# Patient Record
Sex: MALE | Race: WHITE | Employment: OTHER
[De-identification: names, ages, dates, MRNs, and addresses within clinical notes are randomized per-mention and may not be internally consistent; named-entity substitution may affect disease eponyms.]

---

## 2020-09-04 ENCOUNTER — TRANSCRIBE ORDERS (OUTPATIENT)
Dept: OTHER | Age: 70
End: 2020-09-04

## 2020-09-04 DIAGNOSIS — D49.6 BRAIN TUMOR (H): Primary | ICD-10-CM

## 2020-09-09 ENCOUNTER — PRE VISIT (OUTPATIENT)
Dept: NEUROSURGERY | Facility: CLINIC | Age: 70
End: 2020-09-09

## 2020-09-09 NOTE — TELEPHONE ENCOUNTER
FUTURE VISIT INFORMATION      FUTURE VISIT INFORMATION:    Date: 9/11/2020    Time: 115pm    [place] [facility] [place] INFORMATION:    Referring provider:      Referring providers clinic:  [facility]     Reason for visit/diagnosis  Brain Mass     RECORDS REQUESTED FROM:       Clinic name Comments Records Status Imaging Status   [facility]   Requested  Requested                                    -9/10/2020-Spoke with [facility] Radiology. They received request for images yesterday and overnighted them this morning-MR @ 1055am    Action 09/10/20 12:11 PM - [place]   Action Taken  Imaging disc received from [facility] and sent to [facility] to be uploaded into PACs.     -9/11/2020-[facility] images now in PACS-MR @ 532am

## 2020-09-11 ENCOUNTER — PREP FOR PROCEDURE (OUTPATIENT)
Dept: NEUROSURGERY | Facility: CLINIC | Age: 70
End: 2020-09-11

## 2020-09-11 ENCOUNTER — VIRTUAL VISIT (OUTPATIENT)
Dept: NEUROSURGERY | Facility: CLINIC | Age: 70
End: 2020-09-11
Attending: NEUROLOGICAL SURGERY
Payer: COMMERCIAL

## 2020-09-11 DIAGNOSIS — C71.9 GLIOMA (EXCEPT NASAL GLIOMA, NOT NEOPLASTIC) (H): Primary | ICD-10-CM

## 2020-09-11 RX ORDER — METOPROLOL SUCCINATE 25 MG/1
25 TABLET, EXTENDED RELEASE ORAL DAILY
COMMUNITY

## 2020-09-11 RX ORDER — TIOTROPIUM BROMIDE 18 UG/1
18 CAPSULE ORAL; RESPIRATORY (INHALATION) DAILY
COMMUNITY

## 2020-09-11 RX ORDER — POLYETHYLENE GLYCOL 3350 17 G
2 POWDER IN PACKET (EA) ORAL
COMMUNITY

## 2020-09-11 RX ORDER — DEXAMETHASONE 4 MG/1
4 TABLET ORAL EVERY 8 HOURS
Status: ON HOLD | COMMUNITY
End: 2020-09-26

## 2020-09-11 RX ORDER — LEVETIRACETAM 750 MG/1
750 TABLET ORAL 2 TIMES DAILY
Status: ON HOLD | COMMUNITY
End: 2020-09-26

## 2020-09-11 NOTE — LETTER
"    9/11/2020         RE: Alin Reed  [address] [address] [address]        Dear Colleague,    Thank you for referring your patient, Alin Reed, to the [facility]. Please see a copy of my visit note below.    Alin Reed is a 70 year old male who is being evaluated via a billable telephone visit.      The patient has been notified of following:     \"This telephone visit will be conducted via a call between you and your physician/provider. We have found that certain health care needs can be provided without the need for a physical exam.  This service lets us provide the care you need with a short phone conversation.  If a prescription is necessary we can send it directly to your pharmacy.  If lab work is needed we can place an order for that and you can then stop by our lab to have the test done at a later time.    Telephone visits are billed at different rates depending on your insurance coverage. During this emergency period, for some insurers they may be billed the same as an in-person visit.  Please reach out to your insurance provider with any questions.    If during the course of the call the physician/provider feels a telephone visit is not appropriate, you will not be charged for this service.\"    Patient has given verbal consent for Telephone visit?  Yes    What phone number would you like to be contacted at? [phone]    How would you like to obtain your AVS? Mail a copy     Vitals - Patient Reported  Weight (Patient Reported): 63.5 kg (140 lb 1.6 oz)  Height (Patient Reported): 167.6 cm (5' 6\")  BMI (Based on Pt Reported Ht/Wt): 22.61  Pain Score: No Pain (0)    Jose Alberto Blue LPN    Phone call duration: 30 minutes    70 M who presented with new onset seizure. MRI showed a 2 x 1.8 x 1.8 cm CE+ lesion in the supplemental motor area with moderate amount of sai-lesional FLAIR abnormality. On review of systems, the pateint notes difficulty \"finding words' when he is " tired. This symptom resolves after rest. The patient also noted intermittent right hemiparesis.    PMH: newly diagnosed lung nodule, biopsy showed no evidence of neoplasm.    Current Outpatient Medications:      budesonide (PULMICORT FLEXHALER) 90 MCG/ACT inhaler, Inhale 2 puffs into the lungs 2 times daily, Disp: , Rfl:      dexamethasone (DECADRON) 4 MG tablet, Take 4 mg by mouth every 8 hours, Disp: , Rfl:      levETIRAcetam (KEPPRA) 750 MG tablet, Take 750 mg by mouth 2 times daily, Disp: , Rfl:      metFORMIN (GLUCOPHAGE) 500 MG tablet, Take 500 mg by mouth 2 times daily (with meals), Disp: , Rfl:      metoprolol succinate ER (TOPROL-XL) 25 MG 24 hr tablet, Take 25 mg by mouth daily, Disp: , Rfl:      nicotine (COMMIT) 2 MG lozenge, Place 2 mg inside cheek every hour as needed for smoking cessation, Disp: , Rfl:      omeprazole (PRILOSEC) 20 MG DR capsule, Take 20 mg by mouth daily, Disp: , Rfl:      tiotropium (SPIRIVA) 18 MCG inhaled capsule, Inhale 18 mcg into the lungs daily, Disp: , Rfl:     Allergies   Allergen Reactions     Wellbutrin [Bupropion]      Patient reports that he is at baseline and non-focal     MRI from 8/28/2020 is as above described.    AP: 70 M with a new intracranial CE+ lesion worrisome for neoplastic diagnosis. I have reviewed the MRI with the patient. I believe that the patient can benefit from an awake craniotomy with goal for definitive tissue diagnosis and maximal safe resection. Risks and benefits of the procedure were reviewed with the patient. Verbal informed consent was obtained. I will proceed to schedule surgery and consent the patient formally on the morning of the procedure.  All questions were answered.    I have spent 30 minutes today, with the majority of the visit counseling the patient on the diagnosis. All questions were answered. Over 50% of my time on the unit was spent counseling the patient and coordinating surgery.          Amadou Cummins MD        Again, thank  you for allowing me to participate in the care of your patient.        Sincerely,        Amadou Cummins MD

## 2020-09-11 NOTE — PROGRESS NOTES
Pre-operative MRI    Awake craniotomy  5ALA/OH6  [facility]  Stealth neuronavigation

## 2020-09-11 NOTE — PROGRESS NOTES
"Alin Reed is a 70 year old male who is being evaluated via a billable telephone visit.      The patient has been notified of following:     \"This telephone visit will be conducted via a call between you and your physician/provider. We have found that certain health care needs can be provided without the need for a physical exam.  This service lets us provide the care you need with a short phone conversation.  If a prescription is necessary we can send it directly to your pharmacy.  If lab work is needed we can place an order for that and you can then stop by our lab to have the test done at a later time.    Telephone visits are billed at different rates depending on your insurance coverage. During this emergency period, for some insurers they may be billed the same as an in-person visit.  Please reach out to your insurance provider with any questions.    If during the course of the call the physician/provider feels a telephone visit is not appropriate, you will not be charged for this service.\"    Patient has given verbal consent for Telephone visit?  Yes    What phone number would you like to be contacted at? [phone]    How would you like to obtain your AVS? Mail a copy     Vitals - Patient Reported  Weight (Patient Reported): 63.5 kg (140 lb 1.6 oz)  Height (Patient Reported): 167.6 cm (5' 6\")  BMI (Based on Pt Reported Ht/Wt): 22.61  Pain Score: No Pain (0)    Jose Alberto Blue LPSANGEETA    Phone call duration: 30 minutes    70 M who presented with new onset seizure. MRI showed a 2 x 1.8 x 1.8 cm CE+ lesion in the supplemental motor area with moderate amount of sai-lesional FLAIR abnormality. On review of systems, the pateint notes difficulty \"finding words' when he is tired. This symptom resolves after rest. The patient also noted intermittent right hemiparesis.    PMH: newly diagnosed lung nodule, biopsy showed no evidence of neoplasm.    Current Outpatient Medications:      budesonide (PULMICORT FLEXHALER) " 90 MCG/ACT inhaler, Inhale 2 puffs into the lungs 2 times daily, Disp: , Rfl:      dexamethasone (DECADRON) 4 MG tablet, Take 4 mg by mouth every 8 hours, Disp: , Rfl:      levETIRAcetam (KEPPRA) 750 MG tablet, Take 750 mg by mouth 2 times daily, Disp: , Rfl:      metFORMIN (GLUCOPHAGE) 500 MG tablet, Take 500 mg by mouth 2 times daily (with meals), Disp: , Rfl:      metoprolol succinate ER (TOPROL-XL) 25 MG 24 hr tablet, Take 25 mg by mouth daily, Disp: , Rfl:      nicotine (COMMIT) 2 MG lozenge, Place 2 mg inside cheek every hour as needed for smoking cessation, Disp: , Rfl:      omeprazole (PRILOSEC) 20 MG DR capsule, Take 20 mg by mouth daily, Disp: , Rfl:      tiotropium (SPIRIVA) 18 MCG inhaled capsule, Inhale 18 mcg into the lungs daily, Disp: , Rfl:     Allergies   Allergen Reactions     Wellbutrin [Bupropion]      Patient reports that he is at baseline and non-focal     MRI from 8/28/2020 is as above described.    AP: 70 M with a new intracranial CE+ lesion worrisome for neoplastic diagnosis. I have reviewed the MRI with the patient. I believe that the patient can benefit from an awake craniotomy with goal for definitive tissue diagnosis and maximal safe resection. Risks and benefits of the procedure were reviewed with the patient. Verbal informed consent was obtained. I will proceed to schedule surgery and consent the patient formally on the morning of the procedure.  All questions were answered.    I have spent 30 minutes today, with the majority of the visit counseling the patient on the diagnosis. All questions were answered. Over 50% of my time on the unit was spent counseling the patient and coordinating surgery.          Amadou Cummins MD

## 2020-09-11 NOTE — LETTER
Date:September 15, 2020      Patient was self referred, no letter generated. Do not send.        [facility] Health Information

## 2020-09-14 ENCOUNTER — TELEPHONE (OUTPATIENT)
Dept: NEUROSURGERY | Facility: CLINIC | Age: 70
End: 2020-09-14

## 2020-09-14 DIAGNOSIS — Z11.59 ENCOUNTER FOR SCREENING FOR OTHER VIRAL DISEASES: Primary | ICD-10-CM

## 2020-09-14 PROBLEM — C71.9 GLIOMA (EXCEPT NASAL GLIOMA, NOT NEOPLASTIC) (H): Status: ACTIVE | Noted: 2020-09-14

## 2020-09-14 NOTE — TELEPHONE ENCOUNTER
Called patient with surgery date and time.   Patient will have preop and covid testing done locally formerly [facility].

## 2020-09-15 ENCOUNTER — TELEPHONE (OUTPATIENT)
Dept: NEUROSURGERY | Facility: CLINIC | Age: 70
End: 2020-09-15

## 2020-09-15 NOTE — TELEPHONE ENCOUNTER
Spoke with Liss. Informed her patient would need Covid test 4 days prior to surgery with Dr. Cummins. Liss will help patient arrange testing. My contact information and fax number was given to Liss.    Ranjana Joseph, RN, BSN  Neuro-Oncology Care Coordinator  [facility]

## 2020-09-15 NOTE — TELEPHONE ENCOUNTER
[facility] Call Center    Phone Message    May a detailed message be left on voicemail: yes     Reason for Call: Other: Liss read RN from [facility] calling in regards to patient getting COVID test done per Dr. Cummins orders before patient gets biopsy done. Liss is wondering how far in advance this test is allowed to be done. [facility] and wondering if it can be done today. [facility] stated [facility] they normally do COVID tests done the same day as the procedure and is just trying to clarify when this needs to be done.     Please advise and call Liss back at [phone]: [phone]    Action Taken: Other:  NEUROSURGERY     Travel Screening: Not Applicable

## 2020-09-21 LAB — COVID-19 VIRUS PCR: NOT DETECTED

## 2020-09-22 ENCOUNTER — ANESTHESIA EVENT (OUTPATIENT)
Dept: SURGERY | Facility: CLINIC | Age: 70
DRG: 821 | End: 2020-09-22
Payer: COMMERCIAL

## 2020-09-22 ASSESSMENT — COPD QUESTIONNAIRES: CAT_SEVERITY: MODERATE

## 2020-09-22 ASSESSMENT — LIFESTYLE VARIABLES: TOBACCO_USE: 1

## 2020-09-22 ASSESSMENT — ENCOUNTER SYMPTOMS: SEIZURES: 1

## 2020-09-22 NOTE — ANESTHESIA PREPROCEDURE EVALUATION
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]   Procedure(s):  Intraoperative Magnetic Resonance Imaging Left Stealth Assisted Craniotomy     LABS:  CBC: No results found for: WBC, HGB, HCT, PLT  BMP: No results found for: NA, POTASSIUM, CHLORIDE, CO2, BUN, CR, GLC  COAGS: No results found for: PTT, INR, FIBR  POC: No results found for: BGM, HCG, HCGS  OTHER: No results found for: PH, LACT, A1C, JESUS, PHOS, MAG, ALBUMIN, PROTTOTAL, ALT, AST, GGT, ALKPHOS, BILITOTAL, BILIDIRECT, LIPASE, AMYLASE, CARMINE, TSH, T4, T3, CRP, SED     Preop Vitals    BP Readings from Last 3 Encounters:   No data found for BP    Pulse Readings from Last 3 Encounters:   No data found for Pulse      Resp Readings from Last 3 Encounters:   No data found for Resp    SpO2 Readings from Last 3 Encounters:   No data found for SpO2      Temp Readings from Last 1 Encounters:   No data found for Temp    Ht Readings from Last 1 Encounters:   No data found for Ht      Wt Readings from Last 1 Encounters:   No data found for Wt    There is no height or weight on file to calculate BMI.     LDA:        Past Medical History:   Diagnosis Date     COPD (chronic obstructive pulmonary disease) (H)      Hypertension      Lumbar spinal stenosis      Solitary lung nodule       History reviewed. No pertinent surgical history.   Allergies   Allergen Reactions     Wellbutrin [Bupropion]         Anesthesia Evaluation     .             ROS/MED HX    ENT/Pulmonary: Comment: Lung nodule s/p bx c/b PTX s/p chest tube, which has since been removed    (+)tobacco use, .25 packs/day  moderate COPD (on tiotropium and budesonide inhalers; well controlled), , . .   (-) sleep apnea   Neurologic: Comment: Glioma, on decadron, keppra    Lumbar stenosis; sciatica    (+)seizures (on levetiracetam) last  seizure: 8/28/20    (-) CVA and TIA   Cardiovascular: Comment: on metoprolol    (+) hypertension----. : . . . :. dysrhythmias (RBBB) . Previous cardiac testing Echodate:1/10/2019results:EF 55-60%, grade I diastolic dysfunction, no valvular abnormalities.date: results:ECG reviewed date:8/28/2020 results:Sinus rhythm, questionable RBBB date: results:         (-) CAD   METS/Exercise Tolerance:  3 - Able to walk 1-2 blocks without stopping   Hematologic:         Musculoskeletal:  - neg musculoskeletal ROS       GI/Hepatic:     (+) GERD (on omeprazole)       Renal/Genitourinary:  - ROS Renal section negative       Endo:     (+) type II DM .      Psychiatric:         Infectious Disease:  - neg infectious disease ROS       Malignancy:   (+) Malignancy (Left frontal lobe mass) History of Neuro          Other:    - neg other ROS                     PHYSICAL EXAM:   Mental Status/Neuro: A/A/O   Airway: Facies: Feasible  Mallampati: I  TM distance: > 6 cm  Neck ROM: Full   Respiratory: Auscultation: CTAB     Resp. Rate: Normal     Resp. Effort: Normal      CV: Rhythm: Regular  Heart: Normal Sounds   Comments:      Dental: Dentures  Dentures: Upper; Complete                Assessment: Procedure Canceled due to   ASA SCORE: 3       NPO Status: NPO Appropriate     Postop Plan:     Disposition: Inpatient/Admit     PONV Management:  NO PONV Prophylaxis Required       Comments for Plan/Consent:  GETA. PIVx2-3. Standard ASA monitors + arterial line. IV opioids, adjuncts. PACU then ICU postoperatively.    Discussed anesthetic plan with patient.  Patient agrees to blood transfusion if needed.                 Vince Gonzalez MD

## 2020-09-23 ENCOUNTER — ANESTHESIA (OUTPATIENT)
Dept: SURGERY | Facility: CLINIC | Age: 70
DRG: 821 | End: 2020-09-23
Payer: COMMERCIAL

## 2020-09-23 ENCOUNTER — HOSPITAL ENCOUNTER (OUTPATIENT)
Dept: MRI IMAGING | Facility: CLINIC | Age: 70
DRG: 821 | End: 2020-09-23
Attending: NEUROLOGICAL SURGERY
Payer: COMMERCIAL

## 2020-09-23 ENCOUNTER — HOSPITAL ENCOUNTER (INPATIENT)
Facility: CLINIC | Age: 70
LOS: 3 days | Discharge: HOME | DRG: 821 | End: 2020-09-26
Attending: NEUROLOGICAL SURGERY | Admitting: NEUROLOGICAL SURGERY
Payer: COMMERCIAL

## 2020-09-23 DIAGNOSIS — R73.9 HYPERGLYCEMIA: ICD-10-CM

## 2020-09-23 DIAGNOSIS — G93.89 BRAIN MASS: ICD-10-CM

## 2020-09-23 DIAGNOSIS — C85.91 LYMPHOMA OF LYMPH NODES OF HEAD, UNSPECIFIED LYMPHOMA TYPE (H): Primary | ICD-10-CM

## 2020-09-23 DIAGNOSIS — C71.9 GLIOMA (EXCEPT NASAL GLIOMA, NOT NEOPLASTIC) (H): ICD-10-CM

## 2020-09-23 DIAGNOSIS — Z98.890 STATUS POST CRANIOTOMY: ICD-10-CM

## 2020-09-23 LAB
ABO + RH BLD: NORMAL
ABO + RH BLD: NORMAL
BLD GP AB SCN SERPL QL: NORMAL
BLOOD BANK CMNT PATIENT-IMP: NORMAL
CREAT SERPL-MCNC: 0.55 MG/DL (ref 0.66–1.25)
ERYTHROCYTE [DISTWIDTH] IN BLOOD BY AUTOMATED COUNT: 12.6 % (ref 10–15)
GFR SERPL CREATININE-BSD FRML MDRD: >90 ML/MIN/{1.73_M2}
GLUCOSE BLDC GLUCOMTR-MCNC: 190 MG/DL (ref 70–99)
GLUCOSE BLDC GLUCOMTR-MCNC: 220 MG/DL (ref 70–99)
GLUCOSE BLDC GLUCOMTR-MCNC: 268 MG/DL (ref 70–99)
HBA1C MFR BLD: 7.2 % (ref 0–5.6)
HCT VFR BLD AUTO: 48.1 % (ref 40–53)
HGB BLD-MCNC: 16.4 G/DL (ref 13.3–17.7)
MCH RBC QN AUTO: 33.9 PG (ref 26.5–33)
MCHC RBC AUTO-ENTMCNC: 34.1 G/DL (ref 31.5–36.5)
MCV RBC AUTO: 99 FL (ref 78–100)
PLATELET # BLD AUTO: 150 10E9/L (ref 150–450)
POTASSIUM SERPL-SCNC: 4.3 MMOL/L (ref 3.4–5.3)
RBC # BLD AUTO: 4.84 10E12/L (ref 4.4–5.9)
SARS-COV-2 RNA SPEC QL NAA+PROBE: NORMAL
SPECIMEN EXP DATE BLD: NORMAL
SPECIMEN SOURCE: NORMAL
WBC # BLD AUTO: 12.9 10E9/L (ref 4–11)

## 2020-09-23 PROCEDURE — 86901 BLOOD TYPING SEROLOGIC RH(D): CPT | Performed by: ANESTHESIOLOGY

## 2020-09-23 PROCEDURE — A9585 GADOBUTROL INJECTION: HCPCS | Performed by: NEUROLOGICAL SURGERY

## 2020-09-23 PROCEDURE — 36415 COLL VENOUS BLD VENIPUNCTURE: CPT | Performed by: STUDENT IN AN ORGANIZED HEALTH CARE EDUCATION/TRAINING PROGRAM

## 2020-09-23 PROCEDURE — 70552 MRI BRAIN STEM W/DYE: CPT

## 2020-09-23 PROCEDURE — 00000146 ZZHCL STATISTIC GLUCOSE BY METER IP

## 2020-09-23 PROCEDURE — 82565 ASSAY OF CREATININE: CPT | Performed by: ANESTHESIOLOGY

## 2020-09-23 PROCEDURE — 83036 HEMOGLOBIN GLYCOSYLATED A1C: CPT | Performed by: STUDENT IN AN ORGANIZED HEALTH CARE EDUCATION/TRAINING PROGRAM

## 2020-09-23 PROCEDURE — 25500064 ZZH RX 255 OP 636: Performed by: NEUROLOGICAL SURGERY

## 2020-09-23 PROCEDURE — 86900 BLOOD TYPING SEROLOGIC ABO: CPT | Performed by: ANESTHESIOLOGY

## 2020-09-23 PROCEDURE — 85027 COMPLETE CBC AUTOMATED: CPT | Performed by: ANESTHESIOLOGY

## 2020-09-23 PROCEDURE — 00B70ZX EXCISION OF CEREBRAL HEMISPHERE, OPEN APPROACH, DIAGNOSTIC: ICD-10-PCS | Performed by: NEUROLOGICAL SURGERY

## 2020-09-23 PROCEDURE — 25000132 ZZH RX MED GY IP 250 OP 250 PS 637: Performed by: ANESTHESIOLOGY

## 2020-09-23 PROCEDURE — 12000001 ZZH R&B MED SURG/OB UMMC

## 2020-09-23 PROCEDURE — 36415 COLL VENOUS BLD VENIPUNCTURE: CPT | Performed by: ANESTHESIOLOGY

## 2020-09-23 PROCEDURE — 25000132 ZZH RX MED GY IP 250 OP 250 PS 637: Performed by: STUDENT IN AN ORGANIZED HEALTH CARE EDUCATION/TRAINING PROGRAM

## 2020-09-23 PROCEDURE — 84132 ASSAY OF SERUM POTASSIUM: CPT | Performed by: ANESTHESIOLOGY

## 2020-09-23 PROCEDURE — 25000131 ZZH RX MED GY IP 250 OP 636 PS 637: Performed by: STUDENT IN AN ORGANIZED HEALTH CARE EDUCATION/TRAINING PROGRAM

## 2020-09-23 PROCEDURE — U0003 INFECTIOUS AGENT DETECTION BY NUCLEIC ACID (DNA OR RNA); SEVERE ACUTE RESPIRATORY SYNDROME CORONAVIRUS 2 (SARS-COV-2) (CORONAVIRUS DISEASE [COVID-19]), AMPLIFIED PROBE TECHNIQUE, MAKING USE OF HIGH THROUGHPUT TECHNOLOGIES AS DESCRIBED BY CMS-2020-01-R: HCPCS | Performed by: STUDENT IN AN ORGANIZED HEALTH CARE EDUCATION/TRAINING PROGRAM

## 2020-09-23 PROCEDURE — 86850 RBC ANTIBODY SCREEN: CPT | Performed by: ANESTHESIOLOGY

## 2020-09-23 PROCEDURE — 40000883 ZZH CANCELLED SURGERY UP TO 61-90 MINS: Performed by: NEUROLOGICAL SURGERY

## 2020-09-23 PROCEDURE — 8E09XBH COMPUTER ASSISTED PROCEDURE OF HEAD AND NECK REGION, WITH MAGNETIC RESONANCE IMAGING: ICD-10-PCS | Performed by: NEUROLOGICAL SURGERY

## 2020-09-23 RX ORDER — POTASSIUM CHLORIDE 29.8 MG/ML
20 INJECTION INTRAVENOUS
Status: DISCONTINUED | OUTPATIENT
Start: 2020-09-23 | End: 2020-09-24 | Stop reason: HOSPADM

## 2020-09-23 RX ORDER — DEXTROSE MONOHYDRATE 25 G/50ML
25-50 INJECTION, SOLUTION INTRAVENOUS
Status: DISCONTINUED | OUTPATIENT
Start: 2020-09-23 | End: 2020-09-24 | Stop reason: HOSPADM

## 2020-09-23 RX ORDER — POTASSIUM CL/LIDO/0.9 % NACL 10MEQ/0.1L
10 INTRAVENOUS SOLUTION, PIGGYBACK (ML) INTRAVENOUS
Status: DISCONTINUED | OUTPATIENT
Start: 2020-09-23 | End: 2020-09-24 | Stop reason: HOSPADM

## 2020-09-23 RX ORDER — MAGNESIUM SULFATE HEPTAHYDRATE 40 MG/ML
2 INJECTION, SOLUTION INTRAVENOUS DAILY PRN
Status: DISCONTINUED | OUTPATIENT
Start: 2020-09-23 | End: 2020-09-24 | Stop reason: HOSPADM

## 2020-09-23 RX ORDER — MAGNESIUM SULFATE HEPTAHYDRATE 40 MG/ML
4 INJECTION, SOLUTION INTRAVENOUS EVERY 4 HOURS PRN
Status: DISCONTINUED | OUTPATIENT
Start: 2020-09-23 | End: 2020-09-24 | Stop reason: HOSPADM

## 2020-09-23 RX ORDER — AMOXICILLIN 250 MG
1 CAPSULE ORAL 2 TIMES DAILY
Status: DISCONTINUED | OUTPATIENT
Start: 2020-09-23 | End: 2020-09-26 | Stop reason: HOSPADM

## 2020-09-23 RX ORDER — CYCLOBENZAPRINE HCL 10 MG
10 TABLET ORAL ONCE
Status: CANCELLED | OUTPATIENT
Start: 2020-09-23

## 2020-09-23 RX ORDER — POTASSIUM CHLORIDE 7.45 MG/ML
10 INJECTION INTRAVENOUS
Status: DISCONTINUED | OUTPATIENT
Start: 2020-09-23 | End: 2020-09-24 | Stop reason: HOSPADM

## 2020-09-23 RX ORDER — ACETAMINOPHEN 325 MG/1
975 TABLET ORAL ONCE
Status: COMPLETED | OUTPATIENT
Start: 2020-09-23 | End: 2020-09-23

## 2020-09-23 RX ORDER — LEVETIRACETAM 15 MG/ML
1500 INJECTION INTRAVASCULAR ONCE
Status: DISCONTINUED | OUTPATIENT
Start: 2020-09-24 | End: 2020-09-24

## 2020-09-23 RX ORDER — LABETALOL 20 MG/4 ML (5 MG/ML) INTRAVENOUS SYRINGE
10-40 EVERY 10 MIN PRN
Status: DISCONTINUED | OUTPATIENT
Start: 2020-09-23 | End: 2020-09-24 | Stop reason: HOSPADM

## 2020-09-23 RX ORDER — LIDOCAINE 40 MG/G
CREAM TOPICAL
Status: DISCONTINUED | OUTPATIENT
Start: 2020-09-23 | End: 2020-09-23

## 2020-09-23 RX ORDER — HYDRALAZINE HYDROCHLORIDE 20 MG/ML
10-20 INJECTION INTRAMUSCULAR; INTRAVENOUS EVERY 30 MIN PRN
Status: DISCONTINUED | OUTPATIENT
Start: 2020-09-23 | End: 2020-09-24 | Stop reason: HOSPADM

## 2020-09-23 RX ORDER — ONDANSETRON 2 MG/ML
4 INJECTION INTRAMUSCULAR; INTRAVENOUS EVERY 6 HOURS PRN
Status: DISCONTINUED | OUTPATIENT
Start: 2020-09-23 | End: 2020-09-24 | Stop reason: HOSPADM

## 2020-09-23 RX ORDER — OXYCODONE HYDROCHLORIDE 5 MG/1
5-10 TABLET ORAL EVERY 4 HOURS PRN
Status: DISCONTINUED | OUTPATIENT
Start: 2020-09-23 | End: 2020-09-26 | Stop reason: HOSPADM

## 2020-09-23 RX ORDER — NICOTINE POLACRILEX 4 MG
15-30 LOZENGE BUCCAL
Status: DISCONTINUED | OUTPATIENT
Start: 2020-09-23 | End: 2020-09-24 | Stop reason: HOSPADM

## 2020-09-23 RX ORDER — PROCHLORPERAZINE 25 MG
12.5 SUPPOSITORY, RECTAL RECTAL EVERY 12 HOURS PRN
Status: DISCONTINUED | OUTPATIENT
Start: 2020-09-23 | End: 2020-09-24 | Stop reason: HOSPADM

## 2020-09-23 RX ORDER — POTASSIUM CHLORIDE 1.5 G/1.58G
20-40 POWDER, FOR SOLUTION ORAL
Status: DISCONTINUED | OUTPATIENT
Start: 2020-09-23 | End: 2020-09-24 | Stop reason: HOSPADM

## 2020-09-23 RX ORDER — DEXMEDETOMIDINE HYDROCHLORIDE 4 UG/ML
0.2-0.7 INJECTION, SOLUTION INTRAVENOUS CONTINUOUS
Status: DISCONTINUED | OUTPATIENT
Start: 2020-09-23 | End: 2020-09-23

## 2020-09-23 RX ORDER — SODIUM CHLORIDE 9 MG/ML
INJECTION, SOLUTION INTRAVENOUS CONTINUOUS
Status: DISCONTINUED | OUTPATIENT
Start: 2020-09-24 | End: 2020-09-24 | Stop reason: HOSPADM

## 2020-09-23 RX ORDER — SODIUM CHLORIDE, SODIUM LACTATE, POTASSIUM CHLORIDE, CALCIUM CHLORIDE 600; 310; 30; 20 MG/100ML; MG/100ML; MG/100ML; MG/100ML
INJECTION, SOLUTION INTRAVENOUS CONTINUOUS
Status: DISCONTINUED | OUTPATIENT
Start: 2020-09-23 | End: 2020-09-23

## 2020-09-23 RX ORDER — POTASSIUM CHLORIDE 750 MG/1
20-40 TABLET, EXTENDED RELEASE ORAL
Status: DISCONTINUED | OUTPATIENT
Start: 2020-09-23 | End: 2020-09-24 | Stop reason: HOSPADM

## 2020-09-23 RX ORDER — AMOXICILLIN 250 MG
2 CAPSULE ORAL 2 TIMES DAILY
Status: DISCONTINUED | OUTPATIENT
Start: 2020-09-23 | End: 2020-09-26 | Stop reason: HOSPADM

## 2020-09-23 RX ORDER — PROCHLORPERAZINE MALEATE 5 MG
5 TABLET ORAL EVERY 6 HOURS PRN
Status: DISCONTINUED | OUTPATIENT
Start: 2020-09-23 | End: 2020-09-24 | Stop reason: HOSPADM

## 2020-09-23 RX ORDER — ACETAMINOPHEN 325 MG/1
650 TABLET ORAL EVERY 4 HOURS PRN
Status: DISCONTINUED | OUTPATIENT
Start: 2020-09-23 | End: 2020-09-26 | Stop reason: HOSPADM

## 2020-09-23 RX ORDER — LIDOCAINE 40 MG/G
CREAM TOPICAL
Status: DISCONTINUED | OUTPATIENT
Start: 2020-09-23 | End: 2020-09-24 | Stop reason: HOSPADM

## 2020-09-23 RX ORDER — CEFAZOLIN SODIUM 1 G/3ML
1 INJECTION, POWDER, FOR SOLUTION INTRAMUSCULAR; INTRAVENOUS SEE ADMIN INSTRUCTIONS
Status: DISCONTINUED | OUTPATIENT
Start: 2020-09-24 | End: 2020-09-24 | Stop reason: HOSPADM

## 2020-09-23 RX ORDER — METOPROLOL SUCCINATE 25 MG/1
25 TABLET, EXTENDED RELEASE ORAL DAILY
Status: DISCONTINUED | OUTPATIENT
Start: 2020-09-24 | End: 2020-09-26 | Stop reason: HOSPADM

## 2020-09-23 RX ORDER — METOCLOPRAMIDE 5 MG/1
5 TABLET ORAL EVERY 6 HOURS PRN
Status: DISCONTINUED | OUTPATIENT
Start: 2020-09-23 | End: 2020-09-24 | Stop reason: HOSPADM

## 2020-09-23 RX ORDER — CEFAZOLIN SODIUM 2 G/100ML
2 INJECTION, SOLUTION INTRAVENOUS
Status: COMPLETED | OUTPATIENT
Start: 2020-09-24 | End: 2020-09-24

## 2020-09-23 RX ORDER — NALOXONE HYDROCHLORIDE 0.4 MG/ML
.1-.4 INJECTION, SOLUTION INTRAMUSCULAR; INTRAVENOUS; SUBCUTANEOUS
Status: DISCONTINUED | OUTPATIENT
Start: 2020-09-23 | End: 2020-09-23

## 2020-09-23 RX ORDER — NALOXONE HYDROCHLORIDE 0.4 MG/ML
.1-.4 INJECTION, SOLUTION INTRAMUSCULAR; INTRAVENOUS; SUBCUTANEOUS
Status: DISCONTINUED | OUTPATIENT
Start: 2020-09-23 | End: 2020-09-24 | Stop reason: HOSPADM

## 2020-09-23 RX ORDER — GADOBUTROL 604.72 MG/ML
7.5 INJECTION INTRAVENOUS ONCE
Status: COMPLETED | OUTPATIENT
Start: 2020-09-23 | End: 2020-09-23

## 2020-09-23 RX ORDER — FAMOTIDINE 20 MG/1
20 TABLET, FILM COATED ORAL ONCE
Status: COMPLETED | OUTPATIENT
Start: 2020-09-23 | End: 2020-09-23

## 2020-09-23 RX ORDER — DEXAMETHASONE 4 MG/1
4 TABLET ORAL EVERY 8 HOURS
Status: DISCONTINUED | OUTPATIENT
Start: 2020-09-23 | End: 2020-09-26 | Stop reason: HOSPADM

## 2020-09-23 RX ORDER — POLYETHYLENE GLYCOL 3350 17 G/17G
17 POWDER, FOR SOLUTION ORAL DAILY PRN
Status: DISCONTINUED | OUTPATIENT
Start: 2020-09-23 | End: 2020-09-26 | Stop reason: HOSPADM

## 2020-09-23 RX ORDER — METOCLOPRAMIDE HYDROCHLORIDE 5 MG/ML
5 INJECTION INTRAMUSCULAR; INTRAVENOUS EVERY 6 HOURS PRN
Status: DISCONTINUED | OUTPATIENT
Start: 2020-09-23 | End: 2020-09-24 | Stop reason: HOSPADM

## 2020-09-23 RX ORDER — ONDANSETRON 4 MG/1
4 TABLET, ORALLY DISINTEGRATING ORAL EVERY 6 HOURS PRN
Status: DISCONTINUED | OUTPATIENT
Start: 2020-09-23 | End: 2020-09-24 | Stop reason: HOSPADM

## 2020-09-23 RX ORDER — MANNITOL 20 G/100ML
66 INJECTION, SOLUTION INTRAVENOUS ONCE
Status: COMPLETED | OUTPATIENT
Start: 2020-09-24 | End: 2020-09-24

## 2020-09-23 RX ORDER — POLYETHYLENE GLYCOL 3350 17 G
2 POWDER IN PACKET (EA) ORAL
Status: DISCONTINUED | OUTPATIENT
Start: 2020-09-23 | End: 2020-09-25

## 2020-09-23 RX ADMIN — GADOBUTROL 4 ML: 604.72 INJECTION INTRAVENOUS at 12:30

## 2020-09-23 RX ADMIN — NICOTINE POLACRILEX 2 MG: 2 LOZENGE ORAL at 22:43

## 2020-09-23 RX ADMIN — DEXAMETHASONE 4 MG: 4 TABLET ORAL at 20:31

## 2020-09-23 RX ADMIN — OMEPRAZOLE 20 MG: 20 CAPSULE, DELAYED RELEASE ORAL at 22:43

## 2020-09-23 RX ADMIN — LEVETIRACETAM 750 MG: 750 TABLET, FILM COATED ORAL at 20:31

## 2020-09-23 RX ADMIN — FAMOTIDINE 20 MG: 20 TABLET ORAL at 11:46

## 2020-09-23 RX ADMIN — ACETAMINOPHEN 975 MG: 325 TABLET, FILM COATED ORAL at 11:48

## 2020-09-23 ASSESSMENT — ACTIVITIES OF DAILY LIVING (ADL): ADLS_ACUITY_SCORE: 11

## 2020-09-23 ASSESSMENT — COPD QUESTIONNAIRES: COPD: 1

## 2020-09-23 ASSESSMENT — MIFFLIN-ST. JEOR: SCORE: 1368.75

## 2020-09-23 ASSESSMENT — VISUAL ACUITY: OU: BLURRED VISION;GLASSES

## 2020-09-23 ASSESSMENT — ENCOUNTER SYMPTOMS: DYSRHYTHMIAS: 1

## 2020-09-23 NOTE — PLAN OF CARE
Notified per surgeon that case is going to be cancelled and rescheduled for tomorrow. Pt is aware . Report called to RN on 6a and pt transported per wheelchair to room per RN. Pt is planning on calling his wife to let her know.

## 2020-09-23 NOTE — OR NURSING
Voice mail left on family member Kelvin's cell phone regardingf pt's status : still waiting in preop to go into surgery.

## 2020-09-24 ENCOUNTER — ANESTHESIA EVENT (OUTPATIENT)
Dept: SURGERY | Facility: CLINIC | Age: 70
DRG: 821 | End: 2020-09-24
Payer: COMMERCIAL

## 2020-09-24 ENCOUNTER — APPOINTMENT (OUTPATIENT)
Dept: CT IMAGING | Facility: CLINIC | Age: 70
DRG: 821 | End: 2020-09-24
Attending: NEUROLOGICAL SURGERY
Payer: COMMERCIAL

## 2020-09-24 ENCOUNTER — APPOINTMENT (OUTPATIENT)
Dept: CT IMAGING | Facility: CLINIC | Age: 70
DRG: 821 | End: 2020-09-24
Attending: STUDENT IN AN ORGANIZED HEALTH CARE EDUCATION/TRAINING PROGRAM
Payer: COMMERCIAL

## 2020-09-24 ENCOUNTER — ANESTHESIA (OUTPATIENT)
Dept: SURGERY | Facility: CLINIC | Age: 70
DRG: 821 | End: 2020-09-24
Payer: COMMERCIAL

## 2020-09-24 PROBLEM — Z98.890 STATUS POST CRANIOTOMY: Status: ACTIVE | Noted: 2020-09-24

## 2020-09-24 LAB
ANION GAP SERPL CALCULATED.3IONS-SCNC: 4 MMOL/L (ref 3–14)
BUN SERPL-MCNC: 17 MG/DL (ref 7–30)
CALCIUM SERPL-MCNC: 8.2 MG/DL (ref 8.5–10.1)
CHLORIDE SERPL-SCNC: 100 MMOL/L (ref 94–109)
CO2 SERPL-SCNC: 29 MMOL/L (ref 20–32)
CREAT SERPL-MCNC: 0.67 MG/DL (ref 0.66–1.25)
ERYTHROCYTE [DISTWIDTH] IN BLOOD BY AUTOMATED COUNT: 12.6 % (ref 10–15)
GFR SERPL CREATININE-BSD FRML MDRD: >90 ML/MIN/{1.73_M2}
GLUCOSE BLDC GLUCOMTR-MCNC: 122 MG/DL (ref 70–99)
GLUCOSE BLDC GLUCOMTR-MCNC: 123 MG/DL (ref 70–99)
GLUCOSE BLDC GLUCOMTR-MCNC: 148 MG/DL (ref 70–99)
GLUCOSE BLDC GLUCOMTR-MCNC: 178 MG/DL (ref 70–99)
GLUCOSE BLDC GLUCOMTR-MCNC: 185 MG/DL (ref 70–99)
GLUCOSE BLDC GLUCOMTR-MCNC: 189 MG/DL (ref 70–99)
GLUCOSE BLDC GLUCOMTR-MCNC: 200 MG/DL (ref 70–99)
GLUCOSE BLDC GLUCOMTR-MCNC: 228 MG/DL (ref 70–99)
GLUCOSE BLDC GLUCOMTR-MCNC: 231 MG/DL (ref 70–99)
GLUCOSE BLDC GLUCOMTR-MCNC: 235 MG/DL (ref 70–99)
GLUCOSE SERPL-MCNC: 253 MG/DL (ref 70–99)
HCT VFR BLD AUTO: 45.2 % (ref 40–53)
HGB BLD-MCNC: 15.4 G/DL (ref 13.3–17.7)
LABORATORY COMMENT REPORT: NORMAL
MAGNESIUM SERPL-MCNC: 2.2 MG/DL (ref 1.6–2.3)
MCH RBC QN AUTO: 34 PG (ref 26.5–33)
MCHC RBC AUTO-ENTMCNC: 34.1 G/DL (ref 31.5–36.5)
MCV RBC AUTO: 100 FL (ref 78–100)
PHOSPHATE SERPL-MCNC: 3.1 MG/DL (ref 2.5–4.5)
PLATELET # BLD AUTO: 141 10E9/L (ref 150–450)
POTASSIUM SERPL-SCNC: 4.8 MMOL/L (ref 3.4–5.3)
RBC # BLD AUTO: 4.53 10E12/L (ref 4.4–5.9)
SARS-COV-2 RNA SPEC QL NAA+PROBE: NEGATIVE
SODIUM SERPL-SCNC: 133 MMOL/L (ref 133–144)
SPECIMEN SOURCE: NORMAL
WBC # BLD AUTO: 10.1 10E9/L (ref 4–11)

## 2020-09-24 PROCEDURE — 84100 ASSAY OF PHOSPHORUS: CPT | Performed by: STUDENT IN AN ORGANIZED HEALTH CARE EDUCATION/TRAINING PROGRAM

## 2020-09-24 PROCEDURE — 27210995 ZZH RX 272: Performed by: NEUROLOGICAL SURGERY

## 2020-09-24 PROCEDURE — 25000131 ZZH RX MED GY IP 250 OP 636 PS 637: Performed by: STUDENT IN AN ORGANIZED HEALTH CARE EDUCATION/TRAINING PROGRAM

## 2020-09-24 PROCEDURE — 25000128 H RX IP 250 OP 636: Performed by: STUDENT IN AN ORGANIZED HEALTH CARE EDUCATION/TRAINING PROGRAM

## 2020-09-24 PROCEDURE — 40000275 ZZH STATISTIC RCP TIME EA 10 MIN

## 2020-09-24 PROCEDURE — 70450 CT HEAD/BRAIN W/O DYE: CPT

## 2020-09-24 PROCEDURE — 25000125 ZZHC RX 250: Performed by: STUDENT IN AN ORGANIZED HEALTH CARE EDUCATION/TRAINING PROGRAM

## 2020-09-24 PROCEDURE — 20000004 ZZH R&B ICU UMMC

## 2020-09-24 PROCEDURE — 27810169 ZZH OR IMPLANT GENERAL: Performed by: NEUROLOGICAL SURGERY

## 2020-09-24 PROCEDURE — 37000009 ZZH ANESTHESIA TECHNICAL FEE, EACH ADDTL 15 MIN: Performed by: NEUROLOGICAL SURGERY

## 2020-09-24 PROCEDURE — 00000159 ZZHCL STATISTIC H-SEND OUTS PREP: Performed by: NEUROLOGICAL SURGERY

## 2020-09-24 PROCEDURE — 36415 COLL VENOUS BLD VENIPUNCTURE: CPT | Performed by: STUDENT IN AN ORGANIZED HEALTH CARE EDUCATION/TRAINING PROGRAM

## 2020-09-24 PROCEDURE — 36000082 ZZH SURGERY LEVEL 7 EA 15 ADDTL MIN - UMMC: Performed by: NEUROLOGICAL SURGERY

## 2020-09-24 PROCEDURE — 85027 COMPLETE CBC AUTOMATED: CPT | Performed by: STUDENT IN AN ORGANIZED HEALTH CARE EDUCATION/TRAINING PROGRAM

## 2020-09-24 PROCEDURE — 88364 INSITU HYBRIDIZATION (FISH): CPT | Performed by: NEUROLOGICAL SURGERY

## 2020-09-24 PROCEDURE — 37000008 ZZH ANESTHESIA TECHNICAL FEE, 1ST 30 MIN: Performed by: NEUROLOGICAL SURGERY

## 2020-09-24 PROCEDURE — 88365 INSITU HYBRIDIZATION (FISH): CPT | Performed by: NEUROLOGICAL SURGERY

## 2020-09-24 PROCEDURE — 88342 IMHCHEM/IMCYTCHM 1ST ANTB: CPT | Performed by: NEUROLOGICAL SURGERY

## 2020-09-24 PROCEDURE — 00000146 ZZHCL STATISTIC GLUCOSE BY METER IP

## 2020-09-24 PROCEDURE — 00000160 ZZHCL STATISTIC H-SPECIAL HANDLING: Performed by: NEUROLOGICAL SURGERY

## 2020-09-24 PROCEDURE — 25000125 ZZHC RX 250: Performed by: NURSE ANESTHETIST, CERTIFIED REGISTERED

## 2020-09-24 PROCEDURE — 25000125 ZZHC RX 250: Performed by: NEUROLOGICAL SURGERY

## 2020-09-24 PROCEDURE — 80048 BASIC METABOLIC PNL TOTAL CA: CPT | Performed by: STUDENT IN AN ORGANIZED HEALTH CARE EDUCATION/TRAINING PROGRAM

## 2020-09-24 PROCEDURE — 25000128 H RX IP 250 OP 636: Performed by: NEUROLOGICAL SURGERY

## 2020-09-24 PROCEDURE — 36000080 ZZH SURGERY LEVEL 7 1ST 30 MIN - UMMC: Performed by: NEUROLOGICAL SURGERY

## 2020-09-24 PROCEDURE — 25000128 H RX IP 250 OP 636: Performed by: NURSE ANESTHETIST, CERTIFIED REGISTERED

## 2020-09-24 PROCEDURE — 88271 CYTOGENETICS DNA PROBE: CPT | Performed by: STUDENT IN AN ORGANIZED HEALTH CARE EDUCATION/TRAINING PROGRAM

## 2020-09-24 PROCEDURE — 88341 IMHCHEM/IMCYTCHM EA ADD ANTB: CPT | Performed by: NEUROLOGICAL SURGERY

## 2020-09-24 PROCEDURE — 88275 CYTOGENETICS 100-300: CPT | Performed by: STUDENT IN AN ORGANIZED HEALTH CARE EDUCATION/TRAINING PROGRAM

## 2020-09-24 PROCEDURE — 25800030 ZZH RX IP 258 OP 636: Performed by: STUDENT IN AN ORGANIZED HEALTH CARE EDUCATION/TRAINING PROGRAM

## 2020-09-24 PROCEDURE — 40000170 ZZH STATISTIC PRE-PROCEDURE ASSESSMENT II: Performed by: NEUROLOGICAL SURGERY

## 2020-09-24 PROCEDURE — 71000014 ZZH RECOVERY PHASE 1 LEVEL 2 FIRST HR: Performed by: NEUROLOGICAL SURGERY

## 2020-09-24 PROCEDURE — 25000132 ZZH RX MED GY IP 250 OP 250 PS 637: Performed by: STUDENT IN AN ORGANIZED HEALTH CARE EDUCATION/TRAINING PROGRAM

## 2020-09-24 PROCEDURE — C1763 CONN TISS, NON-HUMAN: HCPCS | Performed by: NEUROLOGICAL SURGERY

## 2020-09-24 PROCEDURE — 27210794 ZZH OR GENERAL SUPPLY STERILE: Performed by: NEUROLOGICAL SURGERY

## 2020-09-24 PROCEDURE — C1713 ANCHOR/SCREW BN/BN,TIS/BN: HCPCS | Performed by: NEUROLOGICAL SURGERY

## 2020-09-24 PROCEDURE — 83735 ASSAY OF MAGNESIUM: CPT | Performed by: STUDENT IN AN ORGANIZED HEALTH CARE EDUCATION/TRAINING PROGRAM

## 2020-09-24 PROCEDURE — 25800030 ZZH RX IP 258 OP 636: Performed by: NURSE ANESTHETIST, CERTIFIED REGISTERED

## 2020-09-24 PROCEDURE — 71000015 ZZH RECOVERY PHASE 1 LEVEL 2 EA ADDTL HR: Performed by: NEUROLOGICAL SURGERY

## 2020-09-24 PROCEDURE — 25000125 ZZHC RX 250: Performed by: ANESTHESIOLOGY

## 2020-09-24 PROCEDURE — 40000014 ZZH STATISTIC ARTERIAL MONITORING DAILY

## 2020-09-24 PROCEDURE — 88331 PATH CONSLTJ SURG 1 BLK 1SPC: CPT | Performed by: NEUROLOGICAL SURGERY

## 2020-09-24 PROCEDURE — 88307 TISSUE EXAM BY PATHOLOGIST: CPT | Performed by: NEUROLOGICAL SURGERY

## 2020-09-24 PROCEDURE — 70450 CT HEAD/BRAIN W/O DYE: CPT | Mod: 77

## 2020-09-24 DEVICE — GRAFT DURAGEN 2X2" ID220: Type: IMPLANTABLE DEVICE | Site: CRANIAL | Status: FUNCTIONAL

## 2020-09-24 DEVICE — IMP PLATE SYN STR DOG BONE LOW PROFILE 2H TI 421.502: Type: IMPLANTABLE DEVICE | Site: CRANIAL | Status: FUNCTIONAL

## 2020-09-24 DEVICE — IMP BUR HOLE COVER 17MM LOW PROFILE TI 421.527: Type: IMPLANTABLE DEVICE | Site: CRANIAL | Status: FUNCTIONAL

## 2020-09-24 DEVICE — IMP SCR SYN MATRIX LOW PRO 1.5X04MM SELF DRILL 04.503.104.01: Type: IMPLANTABLE DEVICE | Site: CRANIAL | Status: FUNCTIONAL

## 2020-09-24 RX ORDER — POTASSIUM CHLORIDE 7.45 MG/ML
10 INJECTION INTRAVENOUS
Status: DISCONTINUED | OUTPATIENT
Start: 2020-09-24 | End: 2020-09-26 | Stop reason: HOSPADM

## 2020-09-24 RX ORDER — MAGNESIUM SULFATE HEPTAHYDRATE 40 MG/ML
2 INJECTION, SOLUTION INTRAVENOUS DAILY PRN
Status: DISCONTINUED | OUTPATIENT
Start: 2020-09-24 | End: 2020-09-26 | Stop reason: HOSPADM

## 2020-09-24 RX ORDER — SODIUM CHLORIDE, SODIUM LACTATE, POTASSIUM CHLORIDE, CALCIUM CHLORIDE 600; 310; 30; 20 MG/100ML; MG/100ML; MG/100ML; MG/100ML
INJECTION, SOLUTION INTRAVENOUS CONTINUOUS
Status: DISCONTINUED | OUTPATIENT
Start: 2020-09-24 | End: 2020-09-24 | Stop reason: HOSPADM

## 2020-09-24 RX ORDER — ONDANSETRON 4 MG/1
4 TABLET, ORALLY DISINTEGRATING ORAL EVERY 30 MIN PRN
Status: DISCONTINUED | OUTPATIENT
Start: 2020-09-24 | End: 2020-09-24 | Stop reason: HOSPADM

## 2020-09-24 RX ORDER — SODIUM CHLORIDE, SODIUM GLUCONATE, SODIUM ACETATE, POTASSIUM CHLORIDE AND MAGNESIUM CHLORIDE 526; 502; 368; 37; 30 MG/100ML; MG/100ML; MG/100ML; MG/100ML; MG/100ML
INJECTION, SOLUTION INTRAVENOUS CONTINUOUS PRN
Status: DISCONTINUED | OUTPATIENT
Start: 2020-09-24 | End: 2020-09-24

## 2020-09-24 RX ORDER — MAGNESIUM SULFATE HEPTAHYDRATE 40 MG/ML
4 INJECTION, SOLUTION INTRAVENOUS EVERY 4 HOURS PRN
Status: DISCONTINUED | OUTPATIENT
Start: 2020-09-24 | End: 2020-09-26 | Stop reason: HOSPADM

## 2020-09-24 RX ORDER — POTASSIUM CHLORIDE 29.8 MG/ML
20 INJECTION INTRAVENOUS
Status: DISCONTINUED | OUTPATIENT
Start: 2020-09-24 | End: 2020-09-26 | Stop reason: HOSPADM

## 2020-09-24 RX ORDER — LIDOCAINE 40 MG/G
CREAM TOPICAL
Status: DISCONTINUED | OUTPATIENT
Start: 2020-09-24 | End: 2020-09-26 | Stop reason: HOSPADM

## 2020-09-24 RX ORDER — PROPOFOL 10 MG/ML
INJECTION, EMULSION INTRAVENOUS CONTINUOUS PRN
Status: DISCONTINUED | OUTPATIENT
Start: 2020-09-24 | End: 2020-09-24

## 2020-09-24 RX ORDER — NICOTINE POLACRILEX 4 MG
15-30 LOZENGE BUCCAL
Status: DISCONTINUED | OUTPATIENT
Start: 2020-09-24 | End: 2020-09-24 | Stop reason: HOSPADM

## 2020-09-24 RX ORDER — LEVETIRACETAM 10 MG/ML
1000 INJECTION INTRAVASCULAR ONCE
Status: COMPLETED | OUTPATIENT
Start: 2020-09-24 | End: 2020-09-24

## 2020-09-24 RX ORDER — CEFAZOLIN SODIUM 2 G/100ML
2 INJECTION, SOLUTION INTRAVENOUS EVERY 8 HOURS
Status: DISPENSED | OUTPATIENT
Start: 2020-09-24 | End: 2020-09-25

## 2020-09-24 RX ORDER — DEXTROSE MONOHYDRATE 100 MG/ML
INJECTION, SOLUTION INTRAVENOUS CONTINUOUS PRN
Status: DISCONTINUED | OUTPATIENT
Start: 2020-09-24 | End: 2020-09-24 | Stop reason: HOSPADM

## 2020-09-24 RX ORDER — ONDANSETRON 4 MG/1
4 TABLET, ORALLY DISINTEGRATING ORAL EVERY 6 HOURS PRN
Status: DISCONTINUED | OUTPATIENT
Start: 2020-09-24 | End: 2020-09-26 | Stop reason: HOSPADM

## 2020-09-24 RX ORDER — POTASSIUM CHLORIDE 750 MG/1
20-40 TABLET, EXTENDED RELEASE ORAL
Status: DISCONTINUED | OUTPATIENT
Start: 2020-09-24 | End: 2020-09-26 | Stop reason: HOSPADM

## 2020-09-24 RX ORDER — PROPOFOL 10 MG/ML
INJECTION, EMULSION INTRAVENOUS PRN
Status: DISCONTINUED | OUTPATIENT
Start: 2020-09-24 | End: 2020-09-24

## 2020-09-24 RX ORDER — DEXTROSE MONOHYDRATE 25 G/50ML
25-50 INJECTION, SOLUTION INTRAVENOUS
Status: DISCONTINUED | OUTPATIENT
Start: 2020-09-24 | End: 2020-09-24 | Stop reason: HOSPADM

## 2020-09-24 RX ORDER — SODIUM CHLORIDE, SODIUM LACTATE, POTASSIUM CHLORIDE, CALCIUM CHLORIDE 600; 310; 30; 20 MG/100ML; MG/100ML; MG/100ML; MG/100ML
INJECTION, SOLUTION INTRAVENOUS CONTINUOUS PRN
Status: DISCONTINUED | OUTPATIENT
Start: 2020-09-24 | End: 2020-09-24

## 2020-09-24 RX ORDER — FENTANYL CITRATE 50 UG/ML
25-50 INJECTION, SOLUTION INTRAMUSCULAR; INTRAVENOUS EVERY 5 MIN PRN
Status: DISCONTINUED | OUTPATIENT
Start: 2020-09-24 | End: 2020-09-24 | Stop reason: HOSPADM

## 2020-09-24 RX ORDER — DEXTROSE MONOHYDRATE 25 G/50ML
25-50 INJECTION, SOLUTION INTRAVENOUS
Status: DISCONTINUED | OUTPATIENT
Start: 2020-09-24 | End: 2020-09-26 | Stop reason: HOSPADM

## 2020-09-24 RX ORDER — MEPERIDINE HYDROCHLORIDE 25 MG/ML
12.5 INJECTION INTRAMUSCULAR; INTRAVENOUS; SUBCUTANEOUS
Status: DISCONTINUED | OUTPATIENT
Start: 2020-09-24 | End: 2020-09-24 | Stop reason: HOSPADM

## 2020-09-24 RX ORDER — DEXMEDETOMIDINE HYDROCHLORIDE 4 UG/ML
0.2-1.2 INJECTION, SOLUTION INTRAVENOUS CONTINUOUS
Status: DISCONTINUED | OUTPATIENT
Start: 2020-09-24 | End: 2020-09-24 | Stop reason: HOSPADM

## 2020-09-24 RX ORDER — SODIUM CHLORIDE 9 MG/ML
INJECTION, SOLUTION INTRAVENOUS CONTINUOUS
Status: DISCONTINUED | OUTPATIENT
Start: 2020-09-24 | End: 2020-09-25

## 2020-09-24 RX ORDER — ONDANSETRON 2 MG/ML
4 INJECTION INTRAMUSCULAR; INTRAVENOUS EVERY 30 MIN PRN
Status: DISCONTINUED | OUTPATIENT
Start: 2020-09-24 | End: 2020-09-24 | Stop reason: HOSPADM

## 2020-09-24 RX ORDER — HYDRALAZINE HYDROCHLORIDE 20 MG/ML
10-20 INJECTION INTRAMUSCULAR; INTRAVENOUS EVERY 30 MIN PRN
Status: DISCONTINUED | OUTPATIENT
Start: 2020-09-24 | End: 2020-09-26 | Stop reason: HOSPADM

## 2020-09-24 RX ORDER — DEXAMETHASONE SODIUM PHOSPHATE 4 MG/ML
INJECTION, SOLUTION INTRA-ARTICULAR; INTRALESIONAL; INTRAMUSCULAR; INTRAVENOUS; SOFT TISSUE PRN
Status: DISCONTINUED | OUTPATIENT
Start: 2020-09-24 | End: 2020-09-24

## 2020-09-24 RX ORDER — LABETALOL 20 MG/4 ML (5 MG/ML) INTRAVENOUS SYRINGE
10-40 EVERY 10 MIN PRN
Status: DISCONTINUED | OUTPATIENT
Start: 2020-09-24 | End: 2020-09-26 | Stop reason: HOSPADM

## 2020-09-24 RX ORDER — ONDANSETRON 2 MG/ML
4 INJECTION INTRAMUSCULAR; INTRAVENOUS EVERY 6 HOURS PRN
Status: DISCONTINUED | OUTPATIENT
Start: 2020-09-24 | End: 2020-09-26 | Stop reason: HOSPADM

## 2020-09-24 RX ORDER — HYDROMORPHONE HYDROCHLORIDE 1 MG/ML
.3-.5 INJECTION, SOLUTION INTRAMUSCULAR; INTRAVENOUS; SUBCUTANEOUS EVERY 10 MIN PRN
Status: DISCONTINUED | OUTPATIENT
Start: 2020-09-24 | End: 2020-09-24 | Stop reason: HOSPADM

## 2020-09-24 RX ORDER — NICOTINE POLACRILEX 4 MG
15-30 LOZENGE BUCCAL
Status: DISCONTINUED | OUTPATIENT
Start: 2020-09-24 | End: 2020-09-26 | Stop reason: HOSPADM

## 2020-09-24 RX ORDER — HYDROMORPHONE HYDROCHLORIDE 1 MG/ML
.3-.5 INJECTION, SOLUTION INTRAMUSCULAR; INTRAVENOUS; SUBCUTANEOUS
Status: DISCONTINUED | OUTPATIENT
Start: 2020-09-24 | End: 2020-09-26 | Stop reason: HOSPADM

## 2020-09-24 RX ORDER — POTASSIUM CHLORIDE 1.5 G/1.58G
20-40 POWDER, FOR SOLUTION ORAL
Status: DISCONTINUED | OUTPATIENT
Start: 2020-09-24 | End: 2020-09-26 | Stop reason: HOSPADM

## 2020-09-24 RX ORDER — NALOXONE HYDROCHLORIDE 0.4 MG/ML
.1-.4 INJECTION, SOLUTION INTRAMUSCULAR; INTRAVENOUS; SUBCUTANEOUS
Status: DISCONTINUED | OUTPATIENT
Start: 2020-09-24 | End: 2020-09-24 | Stop reason: HOSPADM

## 2020-09-24 RX ORDER — NALOXONE HYDROCHLORIDE 0.4 MG/ML
.1-.4 INJECTION, SOLUTION INTRAMUSCULAR; INTRAVENOUS; SUBCUTANEOUS
Status: DISCONTINUED | OUTPATIENT
Start: 2020-09-24 | End: 2020-09-26 | Stop reason: HOSPADM

## 2020-09-24 RX ORDER — POTASSIUM CL/LIDO/0.9 % NACL 10MEQ/0.1L
10 INTRAVENOUS SOLUTION, PIGGYBACK (ML) INTRAVENOUS
Status: DISCONTINUED | OUTPATIENT
Start: 2020-09-24 | End: 2020-09-26 | Stop reason: HOSPADM

## 2020-09-24 RX ADMIN — DEXAMETHASONE 4 MG: 4 TABLET ORAL at 04:31

## 2020-09-24 RX ADMIN — DOCUSATE SODIUM 50 MG AND SENNOSIDES 8.6 MG 1 TABLET: 8.6; 5 TABLET, FILM COATED ORAL at 20:30

## 2020-09-24 RX ADMIN — DEXAMETHASONE 4 MG: 4 TABLET ORAL at 20:30

## 2020-09-24 RX ADMIN — SODIUM CHLORIDE, SODIUM GLUCONATE, SODIUM ACETATE, POTASSIUM CHLORIDE AND MAGNESIUM CHLORIDE: 526; 502; 368; 37; 30 INJECTION, SOLUTION INTRAVENOUS at 12:25

## 2020-09-24 RX ADMIN — LEVETIRACETAM 750 MG: 750 TABLET, FILM COATED ORAL at 07:58

## 2020-09-24 RX ADMIN — DEXAMETHASONE SODIUM PHOSPHATE 10 MG: 4 INJECTION, SOLUTION INTRA-ARTICULAR; INTRALESIONAL; INTRAMUSCULAR; INTRAVENOUS; SOFT TISSUE at 12:22

## 2020-09-24 RX ADMIN — REMIFENTANIL HYDROCHLORIDE 0.02 MCG/KG/MIN: 1 INJECTION, POWDER, LYOPHILIZED, FOR SOLUTION INTRAVENOUS at 11:48

## 2020-09-24 RX ADMIN — LABETALOL 20 MG/4 ML (5 MG/ML) INTRAVENOUS SYRINGE 20 MG: at 23:06

## 2020-09-24 RX ADMIN — HYDRALAZINE HYDROCHLORIDE 20 MG: 20 INJECTION INTRAMUSCULAR; INTRAVENOUS at 21:44

## 2020-09-24 RX ADMIN — OXYCODONE HYDROCHLORIDE 5 MG: 5 TABLET ORAL at 20:34

## 2020-09-24 RX ADMIN — SODIUM CHLORIDE 75 ML/HR: 9 INJECTION, SOLUTION INTRAVENOUS at 17:40

## 2020-09-24 RX ADMIN — DEXMEDETOMIDINE 0.7 MCG/KG/HR: 100 INJECTION, SOLUTION, CONCENTRATE INTRAVENOUS at 11:46

## 2020-09-24 RX ADMIN — HUMAN INSULIN 2.5 UNITS/HR: 100 INJECTION, SOLUTION SUBCUTANEOUS at 10:36

## 2020-09-24 RX ADMIN — CEFAZOLIN SODIUM 2 G: 2 INJECTION, SOLUTION INTRAVENOUS at 12:23

## 2020-09-24 RX ADMIN — SODIUM CHLORIDE, POTASSIUM CHLORIDE, SODIUM LACTATE AND CALCIUM CHLORIDE: 600; 310; 30; 20 INJECTION, SOLUTION INTRAVENOUS at 11:46

## 2020-09-24 RX ADMIN — PROPOFOL 40 MG: 10 INJECTION, EMULSION INTRAVENOUS at 12:03

## 2020-09-24 RX ADMIN — SODIUM CHLORIDE, POTASSIUM CHLORIDE, SODIUM LACTATE AND CALCIUM CHLORIDE: 600; 310; 30; 20 INJECTION, SOLUTION INTRAVENOUS at 11:48

## 2020-09-24 RX ADMIN — DEXMEDETOMIDINE HYDROCHLORIDE 8 MCG: 100 INJECTION, SOLUTION INTRAVENOUS at 12:03

## 2020-09-24 RX ADMIN — LEVETIRACETAM 1 G: 10 INJECTION INTRAVENOUS at 12:26

## 2020-09-24 RX ADMIN — LEVETIRACETAM 750 MG: 750 TABLET, FILM COATED ORAL at 20:30

## 2020-09-24 RX ADMIN — LABETALOL 20 MG/4 ML (5 MG/ML) INTRAVENOUS SYRINGE 20 MG: at 22:22

## 2020-09-24 RX ADMIN — OMEPRAZOLE 20 MG: 20 CAPSULE, DELAYED RELEASE ORAL at 21:54

## 2020-09-24 RX ADMIN — LABETALOL 20 MG/4 ML (5 MG/ML) INTRAVENOUS SYRINGE 20 MG: at 23:08

## 2020-09-24 RX ADMIN — SODIUM CHLORIDE: 9 INJECTION, SOLUTION INTRAVENOUS at 00:42

## 2020-09-24 RX ADMIN — PHENYLEPHRINE HYDROCHLORIDE 300 MCG: 10 INJECTION INTRAVENOUS at 12:10

## 2020-09-24 RX ADMIN — PROPOFOL 10 MCG/KG/MIN: 10 INJECTION, EMULSION INTRAVENOUS at 11:50

## 2020-09-24 RX ADMIN — CEFAZOLIN SODIUM 2 G: 2 INJECTION, SOLUTION INTRAVENOUS at 23:06

## 2020-09-24 RX ADMIN — AMINOLEVULINIC ACID HYDROCHLORIDE 1323 MG: 1500 POWDER, FOR SOLUTION ORAL at 10:42

## 2020-09-24 RX ADMIN — LABETALOL 20 MG/4 ML (5 MG/ML) INTRAVENOUS SYRINGE 20 MG: at 21:00

## 2020-09-24 RX ADMIN — MANNITOL 60 G: 20 INJECTION, SOLUTION INTRAVENOUS at 12:37

## 2020-09-24 RX ADMIN — INSULIN ASPART 1 UNITS: 100 INJECTION, SOLUTION INTRAVENOUS; SUBCUTANEOUS at 20:40

## 2020-09-24 ASSESSMENT — VISUAL ACUITY
OU: BLURRED VISION
OU: GLASSES
OU: BLURRED VISION

## 2020-09-24 ASSESSMENT — ACTIVITIES OF DAILY LIVING (ADL)
TRANSFERRING: 0-->INDEPENDENT
RETIRED_EATING: 0-->INDEPENDENT
ADLS_ACUITY_SCORE: 9
TOILETING: 0-->INDEPENDENT
ADLS_ACUITY_SCORE: 11
BATHING: 0-->INDEPENDENT
FALL_HISTORY_WITHIN_LAST_SIX_MONTHS: NO
SWALLOWING: 0-->SWALLOWS FOODS/LIQUIDS WITHOUT DIFFICULTY
DRESS: 0-->INDEPENDENT
RETIRED_COMMUNICATION: 0-->UNDERSTANDS/COMMUNICATES WITHOUT DIFFICULTY
ADLS_ACUITY_SCORE: 11
AMBULATION: 0-->INDEPENDENT
COGNITION: 0 - NO COGNITION ISSUES REPORTED
ADLS_ACUITY_SCORE: 11

## 2020-09-24 ASSESSMENT — MIFFLIN-ST. JEOR: SCORE: 1367.28

## 2020-09-24 ASSESSMENT — COPD QUESTIONNAIRES
COPD: 1
CAT_SEVERITY: MODERATE

## 2020-09-24 NOTE — ADDENDUM NOTE
Addendum  created 09/24/20 1524 by Liberty Sanchez MD    Order list changed, Order sets accessed

## 2020-09-24 NOTE — PLAN OF CARE
Arrived from:  PACU  Belongings/meds:  With patient  2 RN Skin Assessment Completed by:  Jazmyne JOSÉ and Elizabeth TRIANA RN  Non-intact findings documented (yes/no/NA): Yes     Status: Pt transferred to 6a from PACU because surgery (craniotomy) was rescheduled for tomorrow.  Vitals: VSS on RA.   Neuros: Alert and oriented x4. Denies N/T. Pt reports recent change/blurriness to vision; wears glasses. 5/5 throughout.   IV: 3 PIV saline locked  Resp/trach: Lung sounds diminished  Diet: NPO at midnight.   Bowel status: BS+ last bm 9/23  : Voiding without difficulty.   Skin: Pt chest and back with a lot of blanchable redness, pt reports being in the sun a lot but denies pain or recent sunburn  Pain: denies  Activity: up ind in room  Plan: Needs 1 CHG wipe down in the AM. OR in the morning. Continue to monitor and follow POC.

## 2020-09-24 NOTE — PROGRESS NOTES
[facility], [place]   09/24/2020  Neurosurgery Progress Note:    Assessment:  Alin Reed is a 70 year old male who presented for his surgery yesterday that did not end up happening due to delays in the OR. Plan for OR today    Plan:  - OR today for awake left crani for tumor resection    -----------------------------------  Dorinda Dinh MD  Neurosurgery Resident, PGY-2    Please contact neurosurgery resident on call with questions.    Dial * * [phone], enter [phone] when prompted.   -----------------------------------    Interval History: Did not get surgery yesterday due to OR schedule    Objective:   Temp:  [95.5  F (35.3  C)-97.8  F (36.6  C)] 96.7  F (35.9  C)  Pulse:  [66-84] 68  Resp:  [15-16] 16  BP: (109-139)/(64-79) 120/79  SpO2:  [96 %-99 %] 98 %  I/O last 3 completed shifts:  In: 471.25 [I.V.:471.25]  Out: -     Gen: Appears comfortable, NAD  Neurologic:  - Alert & Oriented to person, place, time, and situation  - Follows commands briskly  - Speech fluent, spontaneous. No aphasia or dysarthria.  - No gaze preference. No apparent hemineglect.  - PERRL, EOMI  - Trapezii muscles 5/5 bilaterally  - No pronator drift     Del Tr Bi WE WF Gr   R 5 5 5 5 5 5   L 5 5 5 5 5 5    HF KE KF DF PF EHL   R 5 5 5 5 5 5   L 5 5 5 5 5 5     Sensation intact and symmetric to light touch throughout    LABS:  Recent Labs   Lab 09/24/20  0612 09/23/20  1120     --    POTASSIUM 4.8 4.3   CHLORIDE 100  --    CO2 29  --    ANIONGAP 4  --    *  --    BUN 17  --    CR 0.67 0.55*   JESUS 8.2*  --        Recent Labs   Lab 09/24/20  0612   WBC 10.1   RBC 4.53   HGB 15.4   HCT 45.2      MCH 34.0*   MCHC 34.1   RDW 12.6   *       IMAGING:  Recent Results (from the past 24 hour(s))   MR Brain w Contrast    Narrative    Brain MRI with contrast primarily for the purposes of stereotactic  evaluation    History: Anaplastic gliomas/glioblastoma, 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    Contrast: 4mL Gadavist    Findings: Heterogeneously enhancing, subcortical left posterior  frontal lobe mass measuring 2.8 x 2.5 x 2.5, previously 2.3 x 2.1 x  2.4 cm. There is patchy surrounding T1 hypoattenuation. Mild localized  mass effect with mild effacement of the adjacent sulci. No evidence of  intracranial hemorrhage or midline shift. Ventricles are proportionate  to the cerebral sulci. Paranasal sinuses are unremarkable and within  normal limits. Mastoid air cells demonstrate small volume of fluid.      Impression    Impression: Enlarged left frontal lobe 3.0 cm mass with mild localized  mass effect. Limited imaging primarily for the purposes of  stereotactic localization.     I have personally reviewed the examination and initial interpretation  and I agree with the findings.    TERRA BISHOP MD

## 2020-09-24 NOTE — PROGRESS NOTES
Status: Craniotomy today.  1000 case.    Vitals: VSS on RA  Neuros: A&Ox4.  Report blurriness to vision, wears glasses.  5/5 throughout.  All other neuros intact.  IV: R PIV infusing NS@ 75mL/hr.  PIV x2 on L forearm, SL.  Resp/trach: Lungs diminished.    Diet: NPO since midnight.  Bowel status: BS+.  Last BM 9/23  : Voiding spont, via bathroom.  Skin: Intact.  Back and chest have redness.  Per patient report, that is baseline for him.  Pain: denies  Activity: Up ad ablwinder, indep in room  Plan: OR today, 1000 case.  Still needs one more pre-surgical wipe down- writer will help patient once he wakes up.  Continue with current POC.      0650:  Patient showered with antimicrobial soap, pre-procedure shower done.

## 2020-09-24 NOTE — ANESTHESIA PROCEDURE NOTES
Arterial Line Procedure Note      Staff -   Anesthesiologist:  Behrens, Christopher J, MD  Performed By: anesthesiologist    Procedure Start/Stop Times:     patient identified, IV checked, site marked, risks and benefits discussed, informed consent, monitors and equipment checked, pre-op evaluation and at physician/surgeon's request      Correct Patient: Yes      Correct Position: Yes      Correct Site: Yes      Correct Procedure: Yes      Correct Laterality:  Yes    Site Marked:  Yes  Line Placement:     Procedure:  Arterial Line    Insertion Site:  Radial    Insertion laterality:  Right    Skin Prep: Chloraprep      Patient Prep: patient draped, mask, sterile gloves, sterile gown, hat and hand hygiene      Catheter size:  20 gauge, 12 cm    Cath secured with: suture      Dressing:  Tegaderm    Complications:  None obvious    Arterial waveform: Yes

## 2020-09-24 NOTE — ANESTHESIA CARE TRANSFER NOTE
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    Anesthesia Type:   MAC     Note:  Airway :Nasal Cannula  Patient transferred to:PACU  Comments: VSS,  Report to Juan Diego BOOKER Handoff Report: Identifed the Patient, Identified the Reponsible Provider, Reviewed the pertinent medical history, Discussed the surgical course, Reviewed Intra-OP anesthesia mangement and issues during anesthesia, Set expectations for post-procedure period and Allowed opportunity for questions and acknowledgement of understanding      Vitals: (Last set prior to Anesthesia Care Transfer)    CRNA VITALS  9/24/2020 1353 - 9/24/2020 1430      9/24/2020             Pulse:  80    SpO2:  96 %                Electronically Signed By: JONAH Godoy CRNA  September 24, 2020  2:30 PM

## 2020-09-24 NOTE — ANESTHESIA POSTPROCEDURE EVALUATION
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     Anesthesia Type: MAC       Disposition: Outpatient   Postop Pain Control: Uneventful            Sign Out: Well controlled pain   PONV: No   Neuro/Psych: Uneventful            Sign Out: Acceptable/Baseline neuro status   Airway/Respiratory: Uneventful            Sign Out: Acceptable/Baseline resp. status   CV/Hemodynamics: Uneventful            Sign Out: Acceptable CV status   Other NRE: NONE   DID A NON-ROUTINE EVENT OCCUR? No         Last Anesthesia Record Vitals:  CRNA VITALS  2020 1353 - 2020 1442      2020             Pulse:  80    SpO2:  96 %          Last PACU Vitals:  Vitals Value Taken Time   /68 2020  2:30 PM   Temp 36  C (96.8  F) 2020  2:30 PM   Pulse 75 2020  2:41 PM   Resp 25 2020  2:41 PM   SpO2 96 % 2020  2:41 PM   Temp src     NIBP     Pulse     SpO2     Resp     Temp     Ht Rate     Temp 2     Vitals shown include unvalidated device data.      Electronically Signed By: Christopher J. Behrens, MD, 2020, 2:42 PM

## 2020-09-24 NOTE — BRIEF OP NOTE
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  Complications: None.  Implants:   Implant Name Type Inv. Item Serial No.  Lot No. LRB No. Used Action   GRAFT DURAGEN 2X2&quot;  Bone/Tissue/Biologic GRAFT DURAGEN 2X2&quot;   [employer] [id number] Left 1 Implanted   IMP SCR SYN MATRIX LOW PRO 1.5X04MM SELF DRILL [id number] Metallic Hardware/[place] IMP SCR SYN MATRIX LOW PRO 1.5X04MM SELF DRILL [id number]  [employer]ES-STRATEC 0 Left 9 Implanted   IMP PLATE SYN STR DOG BONE LOW PROFILE 2H .502 Metallic Hardware/[place] IMP PLATE SYN STR DOG BONE LOW PROFILE 2H .502  [employer]ES-STRATEC 0 Left 2 Implanted   IMP BUR HOLE COVER 17MM LOW PROFILE .527 Metallic Hardware/[place] IMP BUR HOLE COVER 17MM LOW PROFILE .527  SYNTHES-STRATEC 0 Left 1 Implanted

## 2020-09-25 ENCOUNTER — APPOINTMENT (OUTPATIENT)
Dept: CT IMAGING | Facility: CLINIC | Age: 70
DRG: 821 | End: 2020-09-25
Attending: STUDENT IN AN ORGANIZED HEALTH CARE EDUCATION/TRAINING PROGRAM
Payer: COMMERCIAL

## 2020-09-25 ENCOUNTER — APPOINTMENT (OUTPATIENT)
Dept: PHYSICAL THERAPY | Facility: CLINIC | Age: 70
DRG: 821 | End: 2020-09-25
Attending: STUDENT IN AN ORGANIZED HEALTH CARE EDUCATION/TRAINING PROGRAM
Payer: COMMERCIAL

## 2020-09-25 LAB
ALBUMIN SERPL-MCNC: 2.7 G/DL (ref 3.4–5)
ALP SERPL-CCNC: 71 U/L (ref 40–150)
ALT SERPL W P-5'-P-CCNC: 34 U/L (ref 0–70)
ANION GAP SERPL CALCULATED.3IONS-SCNC: 7 MMOL/L (ref 3–14)
AST SERPL W P-5'-P-CCNC: 10 U/L (ref 0–45)
BASOPHILS # BLD AUTO: 0 10E9/L (ref 0–0.2)
BASOPHILS NFR BLD AUTO: 0.3 %
BILIRUB DIRECT SERPL-MCNC: 0.2 MG/DL (ref 0–0.2)
BILIRUB SERPL-MCNC: 0.7 MG/DL (ref 0.2–1.3)
BUN SERPL-MCNC: 13 MG/DL (ref 7–30)
CALCIUM SERPL-MCNC: 8.5 MG/DL (ref 8.5–10.1)
CHLORIDE SERPL-SCNC: 101 MMOL/L (ref 94–109)
CO2 SERPL-SCNC: 24 MMOL/L (ref 20–32)
CREAT SERPL-MCNC: 0.48 MG/DL (ref 0.66–1.25)
DIFFERENTIAL METHOD BLD: ABNORMAL
EOSINOPHIL # BLD AUTO: 0 10E9/L (ref 0–0.7)
EOSINOPHIL NFR BLD AUTO: 0 %
ERYTHROCYTE [DISTWIDTH] IN BLOOD BY AUTOMATED COUNT: 12.7 % (ref 10–15)
GFR SERPL CREATININE-BSD FRML MDRD: >90 ML/MIN/{1.73_M2}
GLUCOSE BLDC GLUCOMTR-MCNC: 209 MG/DL (ref 70–99)
GLUCOSE BLDC GLUCOMTR-MCNC: 213 MG/DL (ref 70–99)
GLUCOSE BLDC GLUCOMTR-MCNC: 225 MG/DL (ref 70–99)
GLUCOSE BLDC GLUCOMTR-MCNC: 227 MG/DL (ref 70–99)
GLUCOSE BLDC GLUCOMTR-MCNC: 229 MG/DL (ref 70–99)
GLUCOSE SERPL-MCNC: 188 MG/DL (ref 70–99)
HBV CORE AB SERPL QL IA: NONREACTIVE
HBV SURFACE AB SERPL IA-ACNC: 0.3 M[IU]/ML
HBV SURFACE AG SERPL QL IA: NONREACTIVE
HCT VFR BLD AUTO: 45.2 % (ref 40–53)
HCV AB SERPL QL IA: NONREACTIVE
HGB BLD-MCNC: 15.6 G/DL (ref 13.3–17.7)
HIV 1+2 AB+HIV1 P24 AG SERPL QL IA: NONREACTIVE
IMM GRANULOCYTES # BLD: 0.3 10E9/L (ref 0–0.4)
IMM GRANULOCYTES NFR BLD: 2.3 %
LDH SERPL L TO P-CCNC: 157 U/L (ref 85–227)
LYMPHOCYTES # BLD AUTO: 1 10E9/L (ref 0.8–5.3)
LYMPHOCYTES NFR BLD AUTO: 7.2 %
MCH RBC QN AUTO: 34 PG (ref 26.5–33)
MCHC RBC AUTO-ENTMCNC: 34.5 G/DL (ref 31.5–36.5)
MCV RBC AUTO: 99 FL (ref 78–100)
MONOCYTES # BLD AUTO: 1.4 10E9/L (ref 0–1.3)
MONOCYTES NFR BLD AUTO: 9.8 %
NEUTROPHILS # BLD AUTO: 11.3 10E9/L (ref 1.6–8.3)
NEUTROPHILS NFR BLD AUTO: 80.4 %
PLATELET # BLD AUTO: 122 10E9/L (ref 150–450)
POTASSIUM SERPL-SCNC: 4.3 MMOL/L (ref 3.4–5.3)
PROT SERPL-MCNC: 6 G/DL (ref 6.8–8.8)
RBC # BLD AUTO: 4.59 10E12/L (ref 4.4–5.9)
SODIUM SERPL-SCNC: 132 MMOL/L (ref 133–144)
URATE SERPL-MCNC: 4 MG/DL (ref 3.5–7.2)
WBC # BLD AUTO: 14.1 10E9/L (ref 4–11)

## 2020-09-25 PROCEDURE — 97162 PT EVAL MOD COMPLEX 30 MIN: CPT | Mod: GP

## 2020-09-25 PROCEDURE — 83615 LACTATE (LD) (LDH) ENZYME: CPT | Performed by: PHYSICIAN ASSISTANT

## 2020-09-25 PROCEDURE — 25000131 ZZH RX MED GY IP 250 OP 636 PS 637: Performed by: STUDENT IN AN ORGANIZED HEALTH CARE EDUCATION/TRAINING PROGRAM

## 2020-09-25 PROCEDURE — 80048 BASIC METABOLIC PNL TOTAL CA: CPT | Performed by: NEUROLOGICAL SURGERY

## 2020-09-25 PROCEDURE — 85004 AUTOMATED DIFF WBC COUNT: CPT | Performed by: NEUROLOGICAL SURGERY

## 2020-09-25 PROCEDURE — 84550 ASSAY OF BLOOD/URIC ACID: CPT | Performed by: PHYSICIAN ASSISTANT

## 2020-09-25 PROCEDURE — 25000132 ZZH RX MED GY IP 250 OP 250 PS 637: Performed by: NEUROLOGICAL SURGERY

## 2020-09-25 PROCEDURE — 86803 HEPATITIS C AB TEST: CPT | Performed by: PHYSICIAN ASSISTANT

## 2020-09-25 PROCEDURE — 87389 HIV-1 AG W/HIV-1&-2 AB AG IA: CPT | Performed by: PHYSICIAN ASSISTANT

## 2020-09-25 PROCEDURE — 36415 COLL VENOUS BLD VENIPUNCTURE: CPT | Performed by: NEUROLOGICAL SURGERY

## 2020-09-25 PROCEDURE — 85027 COMPLETE CBC AUTOMATED: CPT | Performed by: NEUROLOGICAL SURGERY

## 2020-09-25 PROCEDURE — 80076 HEPATIC FUNCTION PANEL: CPT | Performed by: PHYSICIAN ASSISTANT

## 2020-09-25 PROCEDURE — 25000128 H RX IP 250 OP 636: Performed by: STUDENT IN AN ORGANIZED HEALTH CARE EDUCATION/TRAINING PROGRAM

## 2020-09-25 PROCEDURE — 40000014 ZZH STATISTIC ARTERIAL MONITORING DAILY

## 2020-09-25 PROCEDURE — 25000132 ZZH RX MED GY IP 250 OP 250 PS 637: Performed by: STUDENT IN AN ORGANIZED HEALTH CARE EDUCATION/TRAINING PROGRAM

## 2020-09-25 PROCEDURE — 70450 CT HEAD/BRAIN W/O DYE: CPT

## 2020-09-25 PROCEDURE — 12000001 ZZH R&B MED SURG/OB UMMC

## 2020-09-25 PROCEDURE — 40000275 ZZH STATISTIC RCP TIME EA 10 MIN

## 2020-09-25 PROCEDURE — 86706 HEP B SURFACE ANTIBODY: CPT | Performed by: PHYSICIAN ASSISTANT

## 2020-09-25 PROCEDURE — 25000132 ZZH RX MED GY IP 250 OP 250 PS 637: Performed by: PHYSICIAN ASSISTANT

## 2020-09-25 PROCEDURE — 86704 HEP B CORE ANTIBODY TOTAL: CPT | Performed by: PHYSICIAN ASSISTANT

## 2020-09-25 PROCEDURE — 00000146 ZZHCL STATISTIC GLUCOSE BY METER IP

## 2020-09-25 PROCEDURE — 40000894 ZZH STATISTIC OT IP EVAL DEFER: Performed by: OCCUPATIONAL THERAPIST

## 2020-09-25 PROCEDURE — 36415 COLL VENOUS BLD VENIPUNCTURE: CPT | Performed by: PHYSICIAN ASSISTANT

## 2020-09-25 PROCEDURE — 87340 HEPATITIS B SURFACE AG IA: CPT | Performed by: PHYSICIAN ASSISTANT

## 2020-09-25 PROCEDURE — 97530 THERAPEUTIC ACTIVITIES: CPT | Mod: GP

## 2020-09-25 RX ORDER — TIOTROPIUM BROMIDE 18 UG/1
18 CAPSULE ORAL; RESPIRATORY (INHALATION) DAILY
Status: DISCONTINUED | OUTPATIENT
Start: 2020-09-25 | End: 2020-09-25

## 2020-09-25 RX ORDER — POLYETHYLENE GLYCOL 3350 17 G
2-4 POWDER IN PACKET (EA) ORAL
Status: DISCONTINUED | OUTPATIENT
Start: 2020-09-25 | End: 2020-09-26 | Stop reason: HOSPADM

## 2020-09-25 RX ADMIN — UMECLIDINIUM 1 PUFF: 62.5 AEROSOL, POWDER ORAL at 10:42

## 2020-09-25 RX ADMIN — LABETALOL 20 MG/4 ML (5 MG/ML) INTRAVENOUS SYRINGE 20 MG: at 10:50

## 2020-09-25 RX ADMIN — DEXAMETHASONE 4 MG: 4 TABLET ORAL at 03:03

## 2020-09-25 RX ADMIN — INSULIN ASPART 2 UNITS: 100 INJECTION, SOLUTION INTRAVENOUS; SUBCUTANEOUS at 07:49

## 2020-09-25 RX ADMIN — LABETALOL 20 MG/4 ML (5 MG/ML) INTRAVENOUS SYRINGE 30 MG: at 17:27

## 2020-09-25 RX ADMIN — LEVETIRACETAM 750 MG: 750 TABLET, FILM COATED ORAL at 21:28

## 2020-09-25 RX ADMIN — LABETALOL 20 MG/4 ML (5 MG/ML) INTRAVENOUS SYRINGE 10 MG: at 06:33

## 2020-09-25 RX ADMIN — DEXAMETHASONE 4 MG: 4 TABLET ORAL at 21:27

## 2020-09-25 RX ADMIN — DEXAMETHASONE 4 MG: 4 TABLET ORAL at 10:49

## 2020-09-25 RX ADMIN — OMEPRAZOLE 20 MG: 20 CAPSULE, DELAYED RELEASE ORAL at 22:45

## 2020-09-25 RX ADMIN — LABETALOL 20 MG/4 ML (5 MG/ML) INTRAVENOUS SYRINGE 30 MG: at 12:16

## 2020-09-25 RX ADMIN — ONDANSETRON 4 MG: 2 INJECTION INTRAMUSCULAR; INTRAVENOUS at 00:26

## 2020-09-25 RX ADMIN — INSULIN ASPART 2 UNITS: 100 INJECTION, SOLUTION INTRAVENOUS; SUBCUTANEOUS at 17:53

## 2020-09-25 RX ADMIN — INSULIN ASPART 2 UNITS: 100 INJECTION, SOLUTION INTRAVENOUS; SUBCUTANEOUS at 11:53

## 2020-09-25 RX ADMIN — ACETAMINOPHEN 650 MG: 325 TABLET, FILM COATED ORAL at 00:26

## 2020-09-25 RX ADMIN — LABETALOL 20 MG/4 ML (5 MG/ML) INTRAVENOUS SYRINGE 20 MG: at 07:55

## 2020-09-25 RX ADMIN — LEVETIRACETAM 750 MG: 750 TABLET, FILM COATED ORAL at 07:57

## 2020-09-25 RX ADMIN — FLUTICASONE FUROATE 1 PUFF: 100 POWDER RESPIRATORY (INHALATION) at 10:42

## 2020-09-25 RX ADMIN — NICOTINE POLACRILEX 2 MG: 2 LOZENGE ORAL at 21:30

## 2020-09-25 RX ADMIN — METOPROLOL SUCCINATE 25 MG: 25 TABLET, EXTENDED RELEASE ORAL at 07:57

## 2020-09-25 RX ADMIN — CEFAZOLIN SODIUM 2 G: 2 INJECTION, SOLUTION INTRAVENOUS at 07:57

## 2020-09-25 ASSESSMENT — VISUAL ACUITY
OU: GLASSES

## 2020-09-25 ASSESSMENT — PAIN DESCRIPTION - DESCRIPTORS
DESCRIPTORS: HEADACHE
DESCRIPTORS: HEADACHE

## 2020-09-25 ASSESSMENT — ACTIVITIES OF DAILY LIVING (ADL)
ADLS_ACUITY_SCORE: 11
ADLS_ACUITY_SCORE: 9
ADLS_ACUITY_SCORE: 11

## 2020-09-25 ASSESSMENT — MIFFLIN-ST. JEOR: SCORE: 1373.63

## 2020-09-25 NOTE — PROGRESS NOTES
Informed by OVI Etienne that Mr. Reed's daughter Francisca requested a call from the care team.  Report that Francisca upset that no one had contacted after surgery.  (Dr. Curtis had spoken at length with the patient's wife after surgery last night.)    Called Francisca's number ([phone]).  No answer, and no ability to leave message.  Informed Jaimie.    Riky Jon M.D.  Neurosurgery Resident, PGY-2    Please contact neurosurgery resident on call with questions.    Dial * * *[phone], enter [phone] when prompted.

## 2020-09-25 NOTE — OP NOTE
Name:  Alin Reed  MRN:  [id number]  YOB: 1950  Date of Surgery:  September 24, 2020      Pre-operative Diagnosis: left frontal mass  Post-operative Diagnosis: same  Procedure:  1) Left frontal craniotomy for tumor resection, awake  2) Microdissection  3) Neuro-navigation    Anesthesia: MAC    Surgeon: Amadou Jerez MD, PhD  Assistant: Ifrah Curtis MD    Description of Procedure: After pre-operative laboratory value and informed consent were verified, the patient was brought into the operating room. The patient underwent MAC. Antibiotic was administered.    The patient was placed in a supine position, with the head turned to the right, exposing the left frontal cranium.  The head was pinned and the [employer] reference frame was placed. The patient's anatomy was registered relative to the pre-operative MRI using the [employer] system.    The region overlying the tumor was identified. An incision was planned based on the location of the tumor. A field block was performed using 0.5% marcaine with dilute epinephrine. The area encompassing the surgical incision was prepped and draped in a sterile manner.     Time out was performed. The incision was made with a 10 blade.  Hemostasis was achieved using a combination of cautery and Danika clips. The incision was retracted using a cerebellar retractor.  The region overlying the tumor was confirmed using the [employer] probe.    Johanne holes were placed at the edge of the bony exposure and connected into a craniotomy. Dural bleeding was controlled. The epidural space was packed with Floseal.     The dura was opened in a cruciate manner. The microscope was brought in to facilitate microdissection. Corticectomy was performed in the region immediate superficial to the lesion. The tumor specimen was immediately apparent. A biopsy was taken and sent to pathology.     The plane between the tumor capsule and the normal cerebrum was identified. This plane  was developed. Through a combination of tumor debulking and plane development, the dissection as carried out until the entirety of the lesion was removed.     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     Meticulous hemostasis was achieved after the tumor resection. The resection cavity was overlaid with surgicel.    After hemostasis, I turned my attention to the closure.  The craniotomy flap was secured using titanium plate/screw construct. The wound was amply irrigated with bacitracin containing saline. The galea was closed with 2'0 vicryl. The skin was closed with 3'0 Monocryl. Exofin was applied.    I was present and performed the key portions of the procedure.    EBL: < 50 cc's    Specimens: resected tumor specimens    Disposition: ICU

## 2020-09-25 NOTE — PROGRESS NOTES
09/25/20 0903   Quick Adds   Type of Visit Initial PT Evaluation       Present no   Living Environment   Lives With spouse   Living Arrangements house   Home Accessibility no concerns   Transportation Anticipated family or friend will provide   Living Environment Comment PT: Pt lives with wife in [place], [place]. Reports wife around to help as needed. Has stairs at home but reports none as being necessary to reach bedroom.    Self-Care   Usual Activity Tolerance excellent   Current Activity Tolerance good   Regular Exercise Yes   Activity/Exercise Type walking;other (see comments)  (taking care of land)   Exercise Amount/Frequency daily   Equipment Currently Used at Home none   Activity/Exercise/Self-Care Comment PT: Retired  and dietary manager. Reports no issues with mobility. 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    Functional Level Prior   Ambulation 0-->independent   Transferring 0-->independent   Toileting 0-->independent   Bathing 0-->independent   Communication 0-->understands/communicates without difficulty   Swallowing 0-->swallows foods/liquids without difficulty   Cognition 0 - no cognition issues reported   Fall history within last six months no   Which of the above functional risks had a recent onset or change? none   Prior Functional Level Comment PT: IND at baseline w/ mobility and ADLs   General Information   Onset of Illness/Injury or Date of Surgery - Date 09/24/20   Referring Physician Terrell Bacon MD    Patient/Family Goals Statement to get back to active lifestyle   Pertinent History of Current Problem (include personal factors and/or comorbidities that impact the POC) Alin Reed is a 70 year old male who is now POD #1 (9/24/20) s/p left frontal craniotomy (awake) for tumor resection. Frozen came back as lymphoma.   Precautions/Limitations other (see comments)  (crani)   Heart Disease Risk Factors Gender;Age;Medical  history   General Observations PT: Pt in supine, agreeable to PT eval   General Info Comments PT: Up w/ assist   Cognitive Status Examination   Orientation orientation to person, place and time   Level of Consciousness alert   Follows Commands and Answers Questions 100% of the time   Personal Safety and Judgment intact   Memory intact   Cognitive Comment PT: word finding difficulties, reports hard to think this AM. When asked for month gave day of week but was accurate   Pain Assessment   Patient Currently in Pain No   Integumentary/Edema   Integumentary/Edema Comments intact surgical incision on head in L frontal region. Tanned skin   Posture    Posture Forward head position;Protracted shoulders   Range of Motion (ROM)   ROM Comment grossly WFL   Strength   Strength Comments 5/5 BLEs   Bed Mobility   Bed Mobility Comments Gordon for supine>sit   Transfer Skills   Transfer Comments SBA sit<>stand w/o device   Gait   Gait Comments CGA progressing to SBA over 435' bout w/ 1UE on IV pole. Steady w/ good tolerance to distance   Balance   Balance Comments good w/ gait, no overt concerns w/ head turns, 360 degree turns   Sensory Examination   Sensory Perception Comments reports no sensory changes   Coordination   Coordination Comments WFL   Muscle Tone   Muscle Tone Comments WFL   Modality Interventions   Planned Modality Interventions Comments none   General Therapy Interventions   Planned Therapy Interventions balance training;bed mobility training;gait training;strengthening;transfer training;risk factor education;home program guidelines;progressive activity/exercise   Clinical Impression   Criteria for Skilled Therapeutic Intervention yes, treatment indicated   PT Diagnosis impaired functional mobility   Influenced by the following impairments post-op precautions, decreased activity tolerance, functional balance    Functional limitations due to impairments decreased (I) w/ bed mobility and gait   Clinical Presentation  "Stable/Uncomplicated   Clinical Presentation Rationale current status, clinical judgement, PMH and home set up   Clinical Decision Making (Complexity) Low complexity   Therapy Frequency 5x/week   Predicted Duration of Therapy Intervention (days/wks) 2-3 days   Anticipated Equipment Needs at Discharge   (none)   Anticipated Discharge Disposition Home with Assist   Risk & Benefits of therapy have been explained Yes   Patient, Family & other staff in agreement with plan of care Yes   Clinical Impression Comments Pt presents with post surgical precautions, mild decreased activity tolerance and functional balance deficits. Pt would benefit from skilled PT to ensure safe mobility within precautions to facilitate home discharge.    [facility] AM-PAC TM \"6 Clicks\"   2016, Trustees of [facility], under license to [employer]  All rights reserved.   6 Clicks Short Forms Basic Mobility Inpatient Short Form   [facility]-PAC  \"6 Clicks\" V.2 Basic Mobility Inpatient Short Form   1. Turning from your back to your side while in a flat bed without using bedrails? 4 - None   2. Moving from lying on your back to sitting on the side of a flat bed without using bedrails? 3 - A Little   3. Moving to and from a bed to a chair (including a wheelchair)? 4 - None   4. Standing up from a chair using your arms (e.g., wheelchair, or bedside chair)? 4 - None   5. To walk in hospital room? 3 - A Little   6. Climbing 3-5 steps with a railing? 3 - A Little   Basic Mobility Raw Score (Score out of 24.Lower scores equate to lower levels of function) 21   Total Evaluation Time   Total Evaluation Time (Minutes) 4     "

## 2020-09-25 NOTE — PROGRESS NOTES
Admitted/transferred from: PACU post op @ 1800.   Reason for admission/transfer: Neuro assessment, hemodynamic monitoring.   Pt status upon admission/transfer: Stable, neurologically intact w/ word finding difficulty.   Interventions: Hemodynamic monitoring, neuro assessments, pain control.   Plan: Frequent neuro checks, repeat CT @ 2200.   2 RN Skin Assessment: Completed by: Emily RN, Arcenio RN.   Results of skin assessment and interventions/actions: Sunburnt/reddened areas, incision, baseline bump R head, otherwise intact.     Ht: Wt: Drug Calc Wt: done    Pt belongings: At bedside, documented. Glasses and phone with patient.   MDRO education (if applicable): NA

## 2020-09-25 NOTE — PLAN OF CARE
Major Shift Events:  Alert and Oriented x3. Pt couldn't get month but could get year. Pt still having word finding difficulty. Pt stood at bedside to use urinal x2. 80 mg of Labetalol given and 20 mg of Hydralazine given overnight to keep SBP below 140.   Plan: Continue to monitor pt for acute changes. Continue to monitor pt's neuro status closely.   For vital signs and complete assessments, please see documentation flowsheets.

## 2020-09-25 NOTE — PROGRESS NOTES
[facility], [place]   09/25/2020  Neurosurgery Progress Note:    Assessment:  Alin Reed is a 70 year old male who is now POD #1 (9/24/20) s/p left frontal craniotomy (awake) for tumor resection. Frozen came back as lymphoma.    Plan:  - Serial neuro exams  - Keppra  - Dex 4q8  - Ancef x3 doses  - Pain control  - HOB > 30 degrees  - Advance diet as tolerated  - Bowel regimen  - PRN antiemetics  - IVF until taking adequate PO  - PT/OT  - SCDs for DVT proph    -----------------------------------  Dorinda Dinh MD  Neurosurgery Resident, PGY-2    Please contact neurosurgery resident on call with questions.    Dial * * *[phone], enter [phone] when prompted.   -----------------------------------    Interval History: OR yesterday. Some speech difficulty post-op    Objective:   Temp:  [96.7  F (35.9  C)-98.2  F (36.8  C)] 98.2  F (36.8  C)  Pulse:  [] 82  Resp:  [11-29] 16  BP: ()/(52-98) 143/98  MAP:  [32 mmHg-118 mmHg] 82 mmHg  Arterial Line BP: ()/(31-88) 112/52  SpO2:  [93 %-99 %] 96 %  I/O last 3 completed shifts:  In: 1969.67 [P.O.:400; I.V.:1369.67]  Out: 1860 [Urine:1850; Blood:10]    Gen: Appears comfortable, NAD  Wound: clean, dry, intact  Neurologic:  - Alert & Oriented to person, place, time, and situation  - Word finding difficulty; Naming intact, repetition intact; spontaneous speech impaired.  - Follows commands briskly  - No dysarthria.  - No gaze preference. No apparent hemineglect.  - PERRL, EOMI  - Face symmetric  - Trapezii muscles 5/5 bilaterally     Del Tr Bi WE WF Gr   R 5 5 5 5 5 5   L 5 5 5 5 5 5    HF KE KF DF PF EHL   R 5 5 5 5 5 5   L 5 5 5 5 5 5     Sensation intact and symmetric to light touch throughout    LABS:  Recent Labs   Lab 09/25/20  0455 09/24/20  0612 09/23/20  1120   * 133  --    POTASSIUM 4.3 4.8 4.3   CHLORIDE 101 100  --    CO2 24 29  --    ANIONGAP 7 4  --    * 253*  --    BUN 13 17  --    CR 0.48* 0.67 0.55*   JESUS  8.5 8.2*  --        Recent Labs   Lab 09/25/20  0455   WBC 14.1*   RBC 4.59   HGB 15.6   HCT 45.2   MCV 99   MCH 34.0*   MCHC 34.5   RDW 12.7   *       IMAGING:  Recent Results (from the past 24 hour(s))   CT Head w/o Contrast    Narrative    CT HEAD W/O CONTRAST 9/24/2020 3:56 PM    History: s/p craniotomy   ICD-10:    [facility]    Technique: Using multidetector thin collimation helical acquisition  technique, axial, coronal and sagittal CT images from the skull base  to the vertex were obtained without intravenous contrast.    Findings: There are postoperative changes of left frontoparietal  craniotomy with underlying resection cavity in the left frontal lobe  and resultant pneumocephalus. There is minimal density associated with  the inferior and posterior portion of the resection cavity which  likely represents a small amount of postoperative hemorrhage. There is  mild pneumocephalus overlying the frontal lobes and beneath the  craniotomy.. . Ventricles are proportionate to the cerebral sulci. The  basal cisterns are clear.    The bony calvaria and the bones of the skull base are normal. The  visualized portions of the paranasal sinuses and mastoid air cells are  clear.       Impression    Impression:  Postoperative changes of left frontal tumor resection with  pneumocephalus and a small amount of adjacent postoperative  hemorrhage. Recommend follow-up in 6 hours.    IVA ROWLAND MD   CT Head w/o Contrast    Narrative    CT HEAD W/O CONTRAST 9/24/2020 10:05 PM    History: Intracranial hemorrhage, known, follow up; S/p glioblastoma  resection. Small amount of hemorrhage in resection cavity postop  followup     Comparison: Earlier same day head CT obtained at 1545 hours, brain MRI  9/23/2020.    Technique: Using multidetector thin collimation helical acquisition  technique, axial, coronal and sagittal CT images from the skull base  to the vertex were obtained without  intravenous contrast.    Findings: Postoperative changes of left frontoparietal craniotomy with  underlying mass resection in the left frontal lobe unchanged  pneumocephalus along the frontal lobe convexities, interhemispheric  fissure, and within the resection bed. There is minimal mass effect  upon bilateral frontal lobes. Unchanged hyperdense blood around the  resection cavity as well as small foci of intraparenchymal hemorrhage  posteriorly and inferiorly. No midline shift. Gray/white matter  differentiation in both cerebral hemispheres is preserved. Ventricles  are proportionate to the cerebral sulci. The basal cisterns are clear.  Atherosclerotic calcifications of the vertebral arteries and carotid  siphons.    Subcutaneous hyperdensity overlying the right frontal bone convexity  likely representing a benign sebaceous cyst. Small amount of  subcutaneous emphysema near the craniotomy site. The visualized  portions of the paranasal sinuses and mastoid air cells are clear.       Impression    Impression:  Postoperative changes of left frontoparietal craniotomy with  underlying mass resection with unchanged amount of adjacent  postoperative hemorrhage. Continued attention on follow-up.    I have personally reviewed the examination and initial interpretation  and I agree with the findings.    TERRA BISHOP MD   CT Head w/o Contrast    Narrative    CT HEAD W/O CONTRAST 9/25/2020 6:19 AM    History: Follow up post op scan   ICD-10:    Comparison: 9/24/2020    Technique: Using multidetector thin collimation helical acquisition  technique, axial, coronal and sagittal CT images from the skull base  to the vertex were obtained without intravenous contrast.    Findings: Postsurgical changes of left frontoparietal craniotomy with  underlying resection cavity in the left frontal lobe. Unchanged  bifrontal pneumocephalus. Unchanged hyperdense foci along the inferior  aspect of the resection cavity likely representing  small punctate  postoperative hemorrhage. No acute basilar matter differentiation. The  ventricles are proportionate to the cerebral sulci. No midline shift.  Basal cisterns are patent. The visualized portions of the paranasal  sinuses and mastoid air cells are clear.       Impression    Impression:  No significant change in the small amount of postoperative hemorrhage  in the left frontal lobe. Recommend continued follow-up. No new  intracranial hemorrhage.    I have personally reviewed the examination and initial interpretation  and I agree with the findings.    TERRA BISHOP MD

## 2020-09-25 NOTE — PLAN OF CARE
Major shift events: Pt A/Ox1, forgetful, redirectable, cooperative, and pleasant. Ambulated in hallway x3. Labetalol given numerous time to keep sbp <140. Room air. Reg diet with good appetite. Ate all of breakfast, lunch and dinner. No BM today. Per pt, pt does not have regular bowel movement. Voiding. Pt has transfer orders to 7D. Per primary team, pt will discharge tomorrow and will receive chemo treatment closer to home.     Plan: Transfer pt tonight. Discharge tomorrow. Contact family when discharge time is known; per family they live 4 hours away and difficult to get transport. Continue POC,notify MD for any changes or concern.

## 2020-09-25 NOTE — PLAN OF CARE
Discharge Planner OT   4A   DEFER       Patient plan for discharge: home  Current status:  Deferring OT consult.  Acknowledge order placed for OT eval and treat.  Upon chart review and discussion with PT, no acute needs identified requiring skilled OT intervention.        Barriers to return to prior living situation: medical needs  Recommendations for discharge: home w A   Rationale for recommendations: Pt with SLP needs, per eval with PT no OT needs.       Entered by: Aislinn King 09/25/2020 9:49 AM

## 2020-09-25 NOTE — PROGRESS NOTES
[facility], [place]  Hematology/Oncology Progress Note    Date of Admission: 9/23/2020  Date of Service (when I saw the patient): 09/25/2020     Assessment & Plan   Alin Reed is a 70 year old male with PHM including COPD who was admitted on 9/23/2020 for biopsy of left frontal lobe mass. He was taken to OR on 9/24/20 and underwent left frontal craniotomy for tumor resection. Frozen pathology suggestive of lymphoma. Transferring from Neuro ICU to [facility] Malignancy team on 9/25/20.       #Lymphoma  Pt initially presented to local ER on 8/28 after suffering a seizure during his routine visit with his [facility] physician. Seizure is described as pt not being able to speak; per wife, he did not fall or have convulsions. Per presurgical outside clinic H&P, he had a brain MRI done (8/28) which showed large frontal mass with edema. He was started on Dexamethasone and Keppra. CT Chest was also done and revealed right upper lung mass with cavitary formation. Pt had a follow-up biopsy of the lung mass at [facility] ([place]) on 9/3 which was negative for malignancy but showed necrotizing granulomatous inflammation. This was complicated by a small right apical pneumothorax. He was referred to Dr. Cummins in Neurosurgery at [facility] for biopsy of the brain mass. He underwent left frontal craniotomy for tumor resection on 9/24. Preliminary results demonstrate lymphoma.   - transfer to [facility] Malignancy  - FISH testing added on, called lab to alert them of this  - sent hepatitis serologies and HIV testing, LDH, uric acid. Lytes and Cr ok.   - d/w pt and wife (via phone). They would prefer for patient to be able to follow-up closer to home (they are closer to [place], [place]). Thus, given pt's stability, we will plan to discharge him home on 9/26 and inpatient team [facility] [place]. Will defer staging workup to be done where he will be treated.     #S/p left frontal  "craniotomy for tumor resection. POD #1.   Procedure done 9/24 by Dr. Cummins.   Results and management as above.  - PT/OT  - PT cleared for pt to discharge home with assist    #R lung necrotizing granulomatous inflammation  Seen on 9/3 lung biopsy. Pt had prior bx of a R lung nodule on 12/6/18; pathology at that time also demonstrated granulomatous change and marked necrosis. On both samples, stains for AFB and GMS were negative for acid-fast bacilli and fungal microorganisms.     #Leukocytosis  #Mild thrombocytopenia  Likely 2/2 steroids. Monitor. Labs otherwise normal on admission, now with mild thrombocytopenia s/p procedure.     #COPD  #Smoking cessation  - continue home inhalers  - nicotine replacement lozenges PRN     #Steroid induced hyperglycemia  Pt denies history of DM2. Reports blood glucoses have been elevated only in context of steroids per pt. I do not have a Hgb A1C prior to his steroid start, but records indicated A1C 6.4 (9/9) and 7.2 (9/24).   - on metformin PTA, holding at present  - cover with SSI    #H/o alcohol dependence  No recent alcohol use.    FEN  - no current MIVFs  - lyte repletion PRN  - regular diet    PPx  - VTE:  - GI: continue PPI    Dispo: Anticipate discharge to home on 9/26.     Discussed and seen with Dr. Horne.       Aislinn Cotton PA-C  Heme/Onc  [phone]      Interval History   POD#1 s/p left frontal craniotomy for tumor resection. Alin is sleeping but wakes for examiner. He has been told by his NSG team that the preliminary results of his biopsy showed lymphoma. We talked briefly about that and that further workup testing will be needed. He states \"I am going to beat this\".  He reports feeling \"frustrated\" by his word finding difficulty. He knows what he wants to say but the words don't always come. Sometimes they come fluidly and other times there are long pauses in his responses. He denies headache or other sites of pain after the procedure. Denies SOB, chest " pain or chest pressure. Denies nausea. He is passing gas post-op but no BM yet.       PMH  Thoracic aortic aneurysm   COPD   Pneumothorax after lung biopsy   Chronic Low back pain    SHx  He is a  with prior service in the [employer]. He currently lives with his wife (Radha) in [place], [place]. Radha is quite disabled per her and doesn't drive. They have 4 children. Being a  family, they have lived all over the world per his wife. No recent EtOH (none since 3 weeks prior to pre-op eval done 9/15); working to quit smoking, reported still smoking 1-2 cigarettes per day (PTA).       Physical Exam   Temp: 97.4  F (36.3  C) Temp src: Oral BP: (!) 147/79 Pulse: 86   Resp: 16 SpO2: 95 % O2 Device: None (Room air) Oxygen Delivery: 2 LPM  Vitals:    09/23/20 1030 09/24/20 1800 09/25/20 0000   Weight: 66.1 kg (145 lb 11.6 oz) 66 kg (145 lb 6.4 oz) 66.6 kg (146 lb 12.8 oz)     Vital Signs with Ranges  Temp:  [96.8  F (36  C)-98.2  F (36.8  C)] 97.4  F (36.3  C)  Pulse:  [] 86  Resp:  [11-29] 16  BP: ()/(52-98) 147/79  MAP:  [32 mmHg-118 mmHg] 82 mmHg  Arterial Line BP: ()/(31-88) 112/52  SpO2:  [93 %-99 %] 95 %  I/O last 3 completed shifts:  In: 2069.67 [P.O.:500; I.V.:1369.67]  Out: 1860 [Urine:1850; Blood:10]    Constitutional: Pleasant, tired appearing male seen reclined in bed. Awake, alert, interactive but struggling with word finding. NAD.   Eyes: Sclerae clear, anicteric.   HENT: +glasses. Large left medial head wound is c/d/i without bleeding, drainage or significant erythema. He does have 2 nodularities on right forehead without overlying ecchymosis. Nontender. OP slightly dry.  Respiratory: No increased work of breathing, good air exchange, on RA. Lungs clear to auscultation bilaterally, no crackles or wheezing appreciated.   Cardiovascular: RRR, no murmur noted.  GI: Normal BS. Abd soft, non-distended, non-tender, no masses or organomegaly appreciated.   Skin: No rashes on limited  exam.  Musculoskeletal: Moves self in bed. There is no redness, warmth, or swelling of the joints. Tone is normal. No edema.   Neurologic: Alert and oriented. Word finding difficulty.   Neuropsychiatric: Calm, normal eye contact, affect congruent. Memory for recent events is intact.         Medications       ceFAZolin  2 g Intravenous Q8H     dexamethasone  4 mg Oral Q8H     fluticasone  1 puff Inhalation Daily     insulin aspart  1-7 Units Subcutaneous TID AC     insulin aspart  1-5 Units Subcutaneous At Bedtime     levETIRAcetam  750 mg Oral BID     metoprolol succinate ER  25 mg Oral Daily     omeprazole  20 mg Oral At Bedtime     senna-docusate  1 tablet Oral BID    Or     senna-docusate  2 tablet Oral BID     sodium chloride (PF)  3 mL Intracatheter Q8H     umeclidinium  1 puff Inhalation Daily       Data   Results for orders placed or performed during the hospital encounter of 09/23/20 (from the past 24 hour(s))   Glucose by meter   Result Value Ref Range    Glucose 123 (H) 70 - 99 mg/dL   Surgical pathology exam   Result Value Ref Range    Copath Report       Patient Name: ALYSSA BUSTAMANTE  MR#: [id number]  Specimen #: [id number]  Reported: 9/24/2020 14:09  Ordering Phy(s): AMAYA GUEVARA    +++PRELIMINARY+++  INTRAOPERATIVE DIAGNOSIS  PRELIMINARY INTRAOPERATIVE FROZEN SECTION DIAGNOSIS:  A1FS) Brain, left tumor, biopsy:       - Malignant tumor, consistent with lymphoma    INTRAOPERATIVE COMMENT:  There is perivascular growth of cells with large, pale, atypical nuclei,   many with prominent nucleoli.  There  are frequent mitoses and karyorrhectic cells.    ELECTRONICALLY SIGNED OUT BY:  KIRIT Tobar M.D., [facility]    Status:  Signed Out  Date Ordered: 9/24/2020 13:07  Date Reported: 9/24/2020 14:09       Glucose by meter   Result Value Ref Range    Glucose 122 (H) 70 - 99 mg/dL   Glucose by meter   Result Value Ref Range    Glucose 148 (H) 70 - 99 mg/dL   CT Head w/o Contrast    Narrative    CT HEAD  W/O CONTRAST 9/24/2020 3:56 PM    History: s/p craniotomy   ICD-10:    Comparison: [facility] MRI dated 9/23/2020    Technique: Using multidetector thin collimation helical acquisition  technique, axial, coronal and sagittal CT images from the skull base  to the vertex were obtained without intravenous contrast.    Findings: There are postoperative changes of left frontoparietal  craniotomy with underlying resection cavity in the left frontal lobe  and resultant pneumocephalus. There is minimal density associated with  the inferior and posterior portion of the resection cavity which  likely represents a small amount of postoperative hemorrhage. There is  mild pneumocephalus overlying the frontal lobes and beneath the  craniotomy.. . Ventricles are proportionate to the cerebral sulci. The  basal cisterns are clear.    The bony calvaria and the bones of the skull base are normal. The  visualized portions of the paranasal sinuses and mastoid air cells are  clear.       Impression    Impression:  Postoperative changes of left frontal tumor resection with  pneumocephalus and a small amount of adjacent postoperative  hemorrhage. Recommend follow-up in 6 hours.    IVA ROWLAND MD   Glucose by meter   Result Value Ref Range    Glucose 185 (H) 70 - 99 mg/dL   Glucose by meter   Result Value Ref Range    Glucose 231 (H) 70 - 99 mg/dL   Glucose by meter   Result Value Ref Range    Glucose 189 (H) 70 - 99 mg/dL   Glucose by meter   Result Value Ref Range    Glucose 178 (H) 70 - 99 mg/dL   CT Head w/o Contrast    Narrative    CT HEAD W/O CONTRAST 9/24/2020 10:05 PM    History: Intracranial hemorrhage, known, follow up; S/p glioblastoma  resection. Small amount of hemorrhage in resection cavity postop  followup     Comparison: Earlier same day head CT obtained at 1545 hours, brain MRI  9/23/2020.    Technique: Using multidetector thin collimation helical acquisition  technique, axial, coronal and sagittal CT images from the skull  base  to the vertex were obtained without intravenous contrast.    Findings: Postoperative changes of left frontoparietal craniotomy with  underlying mass resection in the left frontal lobe unchanged  pneumocephalus along the frontal lobe convexities, interhemispheric  fissure, and within the resection bed. There is minimal mass effect  upon bilateral frontal lobes. Unchanged hyperdense blood around the  resection cavity as well as small foci of intraparenchymal hemorrhage  posteriorly and inferiorly. No midline shift. Gray/white matter  differentiation in both cerebral hemispheres is preserved. Ventricles  are proportionate to the cerebral sulci. The basal cisterns are clear.  Atherosclerotic calcifications of the vertebral arteries and carotid  siphons.    Subcutaneous hyperdensity overlying the right frontal bone convexity  likely representing a benign sebaceous cyst. Small amount of  subcutaneous emphysema near the craniotomy site. The visualized  portions of the paranasal sinuses and mastoid air cells are clear.       Impression    Impression:  Postoperative changes of left frontoparietal craniotomy with  underlying mass resection with unchanged amount of adjacent  postoperative hemorrhage. Continued attention on follow-up.    I have personally reviewed the examination and initial interpretation  and I agree with the findings.    TERRA BISHOP MD   Basic metabolic panel   Result Value Ref Range    Sodium 132 (L) 133 - 144 mmol/L    Potassium 4.3 3.4 - 5.3 mmol/L    Chloride 101 94 - 109 mmol/L    Carbon Dioxide 24 20 - 32 mmol/L    Anion Gap 7 3 - 14 mmol/L    Glucose 188 (H) 70 - 99 mg/dL    Urea Nitrogen 13 7 - 30 mg/dL    Creatinine 0.48 (L) 0.66 - 1.25 mg/dL    GFR Estimate >90 >60 mL/min/[1.73_m2]    GFR Estimate If Black >90 >60 mL/min/[1.73_m2]    Calcium 8.5 8.5 - 10.1 mg/dL   CBC with platelets   Result Value Ref Range    WBC 14.1 (H) 4.0 - 11.0 10e9/L    RBC Count 4.59 4.4 - 5.9 10e12/L     Hemoglobin 15.6 13.3 - 17.7 g/dL    Hematocrit 45.2 40.0 - 53.0 %    MCV 99 78 - 100 fl    MCH 34.0 (H) 26.5 - 33.0 pg    MCHC 34.5 31.5 - 36.5 g/dL    RDW 12.7 10.0 - 15.0 %    Platelet Count 122 (L) 150 - 450 10e9/L   Glucose by meter   Result Value Ref Range    Glucose 209 (H) 70 - 99 mg/dL   CT Head w/o Contrast    Narrative    CT HEAD W/O CONTRAST 9/25/2020 6:19 AM    History: Follow up post op scan   ICD-10:    Comparison: 9/24/2020    Technique: Using multidetector thin collimation helical acquisition  technique, axial, coronal and sagittal CT images from the skull base  to the vertex were obtained without intravenous contrast.    Findings: Postsurgical changes of left frontoparietal craniotomy with  underlying resection cavity in the left frontal lobe. Unchanged  bifrontal pneumocephalus. Unchanged hyperdense foci along the inferior  aspect of the resection cavity likely representing small punctate  postoperative hemorrhage. No acute basilar matter differentiation. The  ventricles are proportionate to the cerebral sulci. No midline shift.  Basal cisterns are patent. The visualized portions of the paranasal  sinuses and mastoid air cells are clear.       Impression    Impression:  No significant change in the small amount of postoperative hemorrhage  in the left frontal lobe. Recommend continued follow-up. No new  intracranial hemorrhage.    I have personally reviewed the examination and initial interpretation  and I agree with the findings.    TERRA BISHOP MD   Glucose by meter   Result Value Ref Range    Glucose 225 (H) 70 - 99 mg/dL

## 2020-09-25 NOTE — PLAN OF CARE
Discharge Planner PT   Patient plan for discharge: home  Current status: PT eval completed. VSS on RA, mild increase in BP post activity, pt asymptomatic. Pt oriented and following commands appropriately, not impulsive. Noted word finding difficulties however with conversation. Educated on craniotomy precautions and pt reports understanding. Pt is Gordon for supine>sit, SBA for sit<>stand and ambulates 435' w/ IV pole and SBA, steady with good activity tolerance. Recommend up w/ nursing w/ CGA and IV pole throughout the day to progress tolerance to activity.   Barriers to return to prior living situation: medical, mobility  Recommendations for discharge: home w/ assist  Rationale for recommendations: Anticipate home w/ assist from wife PRN. Recommend OP SLP follow up for word finding difficulties.        Entered by: Sommer Montgomery 09/25/2020 9:57 AM

## 2020-09-25 NOTE — PROGRESS NOTES
Informed by OVI Etienne that Mr. Reed's daughter Francisca requested a call from the care team.  Report that Francisca upset that no one had contacted after surgery.  (Dr. Curtis had spoken at length with the patient's wife after surgery last night.)    Called Francisca's number ([phone]).  No answer, and no ability to leave message.  Informed Jaimie.    Riky Jon M.D.  Neurosurgery Resident, PGY-2    Please contact neurosurgery resident on call with questions.    Dial * [phone], enter [phone] when prompted.

## 2020-09-26 VITALS
RESPIRATION RATE: 16 BRPM | OXYGEN SATURATION: 95 % | BODY MASS INDEX: 23.59 KG/M2 | HEART RATE: 80 BPM | HEIGHT: 66 IN | DIASTOLIC BLOOD PRESSURE: 72 MMHG | WEIGHT: 146.8 LBS | SYSTOLIC BLOOD PRESSURE: 132 MMHG | TEMPERATURE: 97.4 F

## 2020-09-26 LAB
ANION GAP SERPL CALCULATED.3IONS-SCNC: 5 MMOL/L (ref 3–14)
BASOPHILS # BLD AUTO: 0.1 10E9/L (ref 0–0.2)
BASOPHILS NFR BLD AUTO: 0.7 %
BUN SERPL-MCNC: 17 MG/DL (ref 7–30)
CALCIUM SERPL-MCNC: 8.7 MG/DL (ref 8.5–10.1)
CHLORIDE SERPL-SCNC: 101 MMOL/L (ref 94–109)
CO2 SERPL-SCNC: 26 MMOL/L (ref 20–32)
CREAT SERPL-MCNC: 0.56 MG/DL (ref 0.66–1.25)
DIFFERENTIAL METHOD BLD: ABNORMAL
EOSINOPHIL # BLD AUTO: 0 10E9/L (ref 0–0.7)
EOSINOPHIL NFR BLD AUTO: 0 %
ERYTHROCYTE [DISTWIDTH] IN BLOOD BY AUTOMATED COUNT: 12.5 % (ref 10–15)
GFR SERPL CREATININE-BSD FRML MDRD: >90 ML/MIN/{1.73_M2}
GLUCOSE BLDC GLUCOMTR-MCNC: 229 MG/DL (ref 70–99)
GLUCOSE BLDC GLUCOMTR-MCNC: 232 MG/DL (ref 70–99)
GLUCOSE BLDC GLUCOMTR-MCNC: 232 MG/DL (ref 70–99)
GLUCOSE SERPL-MCNC: 224 MG/DL (ref 70–99)
HCT VFR BLD AUTO: 43 % (ref 40–53)
HGB BLD-MCNC: 14.9 G/DL (ref 13.3–17.7)
IMM GRANULOCYTES # BLD: 0.5 10E9/L (ref 0–0.4)
IMM GRANULOCYTES NFR BLD: 4 %
LYMPHOCYTES # BLD AUTO: 1 10E9/L (ref 0.8–5.3)
LYMPHOCYTES NFR BLD AUTO: 9.1 %
MAGNESIUM SERPL-MCNC: 2.3 MG/DL (ref 1.6–2.3)
MCH RBC QN AUTO: 34.3 PG (ref 26.5–33)
MCHC RBC AUTO-ENTMCNC: 34.7 G/DL (ref 31.5–36.5)
MCV RBC AUTO: 99 FL (ref 78–100)
MONOCYTES # BLD AUTO: 1.3 10E9/L (ref 0–1.3)
MONOCYTES NFR BLD AUTO: 11.2 %
NEUTROPHILS # BLD AUTO: 8.5 10E9/L (ref 1.6–8.3)
NEUTROPHILS NFR BLD AUTO: 75 %
NRBC # BLD AUTO: 0 10*3/UL
NRBC BLD AUTO-RTO: 0 /100
PHOSPHATE SERPL-MCNC: 2.4 MG/DL (ref 2.5–4.5)
PLATELET # BLD AUTO: 101 10E9/L (ref 150–450)
POTASSIUM SERPL-SCNC: 4.2 MMOL/L (ref 3.4–5.3)
RBC # BLD AUTO: 4.34 10E12/L (ref 4.4–5.9)
SODIUM SERPL-SCNC: 131 MMOL/L (ref 133–144)
WBC # BLD AUTO: 11.4 10E9/L (ref 4–11)

## 2020-09-26 PROCEDURE — 25800030 ZZH RX IP 258 OP 636: Performed by: STUDENT IN AN ORGANIZED HEALTH CARE EDUCATION/TRAINING PROGRAM

## 2020-09-26 PROCEDURE — 84295 ASSAY OF SERUM SODIUM: CPT | Performed by: NURSE PRACTITIONER

## 2020-09-26 PROCEDURE — 85025 COMPLETE CBC W/AUTO DIFF WBC: CPT | Performed by: NEUROLOGICAL SURGERY

## 2020-09-26 PROCEDURE — 84100 ASSAY OF PHOSPHORUS: CPT | Performed by: NEUROLOGICAL SURGERY

## 2020-09-26 PROCEDURE — 36415 COLL VENOUS BLD VENIPUNCTURE: CPT | Performed by: NEUROLOGICAL SURGERY

## 2020-09-26 PROCEDURE — 25000132 ZZH RX MED GY IP 250 OP 250 PS 637: Performed by: STUDENT IN AN ORGANIZED HEALTH CARE EDUCATION/TRAINING PROGRAM

## 2020-09-26 PROCEDURE — 80048 BASIC METABOLIC PNL TOTAL CA: CPT | Performed by: NEUROLOGICAL SURGERY

## 2020-09-26 PROCEDURE — 25000131 ZZH RX MED GY IP 250 OP 636 PS 637: Performed by: STUDENT IN AN ORGANIZED HEALTH CARE EDUCATION/TRAINING PROGRAM

## 2020-09-26 PROCEDURE — 83735 ASSAY OF MAGNESIUM: CPT | Performed by: NEUROLOGICAL SURGERY

## 2020-09-26 PROCEDURE — 25000125 ZZHC RX 250: Performed by: STUDENT IN AN ORGANIZED HEALTH CARE EDUCATION/TRAINING PROGRAM

## 2020-09-26 PROCEDURE — 00000146 ZZHCL STATISTIC GLUCOSE BY METER IP

## 2020-09-26 RX ORDER — OXYCODONE HYDROCHLORIDE 5 MG/1
5-10 TABLET ORAL EVERY 4 HOURS PRN
Qty: 5 TABLET | Refills: 0 | Status: SHIPPED | OUTPATIENT
Start: 2020-09-26

## 2020-09-26 RX ORDER — LEVETIRACETAM 750 MG/1
750 TABLET ORAL 2 TIMES DAILY
Qty: 60 TABLET | Refills: 0 | Status: SHIPPED | OUTPATIENT
Start: 2020-09-26

## 2020-09-26 RX ORDER — DEXAMETHASONE 4 MG/1
4 TABLET ORAL EVERY 8 HOURS
Qty: 90 TABLET | Refills: 0 | Status: SHIPPED | OUTPATIENT
Start: 2020-09-26

## 2020-09-26 RX ORDER — AMOXICILLIN 250 MG
1 CAPSULE ORAL 2 TIMES DAILY
Qty: 20 TABLET | Refills: 0 | Status: SHIPPED | OUTPATIENT
Start: 2020-09-26

## 2020-09-26 RX ORDER — ONDANSETRON 4 MG/1
4 TABLET, ORALLY DISINTEGRATING ORAL EVERY 6 HOURS PRN
Qty: 10 TABLET | Refills: 0 | Status: SHIPPED | OUTPATIENT
Start: 2020-09-26

## 2020-09-26 RX ORDER — ACETAMINOPHEN 325 MG/1
650 TABLET ORAL EVERY 4 HOURS PRN
COMMUNITY
Start: 2020-09-26

## 2020-09-26 RX ADMIN — DEXAMETHASONE 4 MG: 4 TABLET ORAL at 04:45

## 2020-09-26 RX ADMIN — INSULIN ASPART 2 UNITS: 100 INJECTION, SOLUTION INTRAVENOUS; SUBCUTANEOUS at 09:21

## 2020-09-26 RX ADMIN — SODIUM PHOSPHATE, MONOBASIC, MONOHYDRATE AND SODIUM PHOSPHATE, DIBASIC, ANHYDROUS 15 MMOL: 276; 142 INJECTION, SOLUTION INTRAVENOUS at 09:33

## 2020-09-26 RX ADMIN — LEVETIRACETAM 750 MG: 750 TABLET, FILM COATED ORAL at 09:20

## 2020-09-26 RX ADMIN — METOPROLOL SUCCINATE 25 MG: 25 TABLET, EXTENDED RELEASE ORAL at 09:20

## 2020-09-26 RX ADMIN — DEXAMETHASONE 4 MG: 4 TABLET ORAL at 12:45

## 2020-09-26 ASSESSMENT — ACTIVITIES OF DAILY LIVING (ADL)
ADLS_ACUITY_SCORE: 11
ADLS_ACUITY_SCORE: 13

## 2020-09-26 NOTE — PROGRESS NOTES
Nursing Focus: Discharge    D: Patient discharged to home at 1310 by neighber  All belongings sent with patient.    I: Discharge prescriptions sent to discharge pharmacy to be filled. All discharge medications and instructions reviewed with patient. Patient instructed to call clinic triage nurse if he experiences a fever >100.4, uncontrolled nausea, vomiting, diarrhea, or pain; or experiences any signs or symptoms of bleeding. Other phone numbers to call with questions or concerns after discharge reviewed with patient. Follow-up outpatient appointment scheduled reviewed with patient.  R and L piv removed. Education completed.  Care Plan goals met and adequate for discharge.    A: Patient verbalized understanding of discharge medications and instructions. Patient will  medications at discharge pharmacy..     P: Patient to follow-up in [facility]. Will receive call for follow up plan this coming Monday or Tuesday

## 2020-09-26 NOTE — PLAN OF CARE
[id number]:  VSS. Afebrile. O2 saturations >92% on RA. Denied pain, nausea, dizziness, lightheadedness. Neuro checks q4h. Pt intermittently disoriented to time; otherwise consistently A&O x3. Pt forgetful with word finding difficulty. Surgical incision site open to air; c/d/i. PIV x2; both saline locked. BG @ 2200: 227 (given sliding scale Novolog); BG @ 0200: 229; BG @ 0600: 232. Up Ax1 with gait belt; bed alarm on. Urinal at bedside; adequate urine output. No BM this shift; passing gas.     Plan to [facility] [place]

## 2020-09-26 NOTE — PROGRESS NOTES
The patient has been seen and evaluated by me, and I have reviewed today's vital signs, medications, labs, and imaging results independently. I have discussed the patient and plan with the team, and agree with the findings and plan outlined in this note. Key aspects of my evaluation, impression, and plan are as follows.     Overnight events, vital signs, labs and meds reviewed.     Alin Reed is a 70 year old male with PHM including COPD who was admitted on 9/23/2020 for biopsy of left frontal lobe mass. He was taken to OR on 9/24/20 and underwent left frontal craniotomy for tumor resection. Frozen pathology suggestive of lymphoma. Transferring from Neuro ICU to Heme Malignancy team on 9/25/20.   -Need to r/o a systemic lymphoma component  -he is coming near [place]. It is not feasible for him to f/v here. 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

## 2020-09-26 NOTE — DISCHARGE SUMMARY
[facility], [place]    Discharge Summary  Hematology / Oncology    Date of Admission:  9/23/2020  Date of Discharge:  9/26/2020  1:17 PM  Discharging Provider: Aislinn Cotton  Date of Service (when I saw the patient): 9/26/20    Discharge Diagnoses   Lymphoma   S/p left frontal craniotomy for tumor resection.   Expressive aphasia  Leukocytosis  Mild thrombocytopenia  COPD  Steroid induced hyperglycemia  Tobacco dependence/smoking cessation        History of Present Illness   Alin Reed is a 70 year old male with PHM including COPD who was admitted on 9/23/2020 for biopsy of left frontal lobe mass. He was taken to OR on 9/24/20 and underwent left frontal craniotomy for tumor resection. Frozen pathology suggestive of lymphoma. Transferred from Neuro ICU to Heme Malignancy team on 9/25/20.     Team discussed with patient and wife and it was determined that they would prefer for patient to be able to follow-up closer to home (they are closer to [place], [place] and would [facility]). Thus, given pt's stability and PT clearance for discharge to home, we are discharging him home on 9/26 and inpatient team will [facility] the [facility] [place]. Will defer staging workup to be done where he will be treated.     - continue Dex 4mg q8hr (refill provided)  - continue Keppra BID (refill provided)  - small script for oxycodone 5mg (#5 tablets) provided should pt have difficulty with any post-op pain. He had not required anything more than Tylenol post-op inpatient. His wife stated they have Tylenol at home.   - Hematology team at [facility] to call pt/wife early week of 9/28 with follow-up instructions        Hospital Course   Alin Reed was admitted on 9/23/2020.  The following problems were addressed during his hospitalization:    #Lymphoma  Pt initially presented to local ER on 8/28 after suffering a seizure during his routine visit with his  [facility] physician. Seizure is described as pt not being able to speak; per wife, he did not fall or have convulsions. Per presurgical outside clinic H&P, he had a brain MRI done (8/28) which showed large frontal mass with edema. He was started on Dexamethasone and Keppra. CT Chest was also done and revealed right upper lung mass with cavitary formation. Pt had a follow-up biopsy of the lung mass at [facility] ([place]) on 9/3 which was negative for malignancy but showed necrotizing granulomatous inflammation. This was complicated by a small right apical pneumothorax. He was referred to Dr. Cummins in Neurosurgery at [facility] for biopsy of the brain mass. He underwent left frontal craniotomy for tumor resection on 9/24. Preliminary results demonstrate lymphoma. Transferred to [facility] Malignancy service.  - FISH testing added on, called lab to alert them of this  - sent hepatitis serologies and HIV testing, LDH, uric acid. Lytes and Cr ok.   - d/w pt and wife (via phone). They would prefer for patient to be able to follow-up closer to home (they are closer to [place], [place]). Thus, given pt's stability, we are discharging him home on 9/26 and inpatient team will [facility] [place]. Will defer staging workup to be done where he will be treated.      #S/p left frontal craniotomy for tumor resection. POD #2.   Procedure done 9/24 by Dr. Cummins.   Results and management as above.  - PT/OT  - PT cleared for pt to discharge home with assist  - referral for OP SLP placed for word finding difficulties     #R lung necrotizing granulomatous inflammation  Seen on 9/3 lung biopsy. Pt had prior bx of a R lung nodule on 12/6/18; pathology at that time also demonstrated granulomatous change and marked necrosis. On both samples, stains for AFB and GMS were negative for acid-fast bacilli and fungal microorganisms.      #Leukocytosis  #Mild thrombocytopenia  Likely 2/2 steroids. Monitor. Labs otherwise normal on  admission, now with mild thrombocytopenia s/p procedure.      #COPD  #Smoking cessation  - continue home inhalers  - nicotine replacement lozenges PRN      #Steroid induced hyperglycemia  Pt denies history of DM2. Reports blood glucoses have been elevated only in context of steroids per pt. I do not have a Hgb A1C prior to his steroid start, but records indicated A1C 6.4 (9/9) and 7.2 (9/24).   - on metformin PTA, held during admission. Resume on discharge.   - covered with sliding scale insulin during admission     #H/o alcohol dependence  No recent alcohol use.     FEN  - no current MIVFs  - lyte repletion PRN  - regular diet     PPx  - VTE: none sai-procedurally  - GI: continue PPI     Dispo: Discharged to home on 9/26.      Discussed with Dr. Horne.         Aislinn Cotton PA-C  Heme/Onc  [phone]    Physical Exam   Temp: 97.4  F (36.3  C) Temp src: Oral BP: 132/72 Pulse: 80   Resp: 16 SpO2: 95 % O2 Device: None (Room air)    Vitals:    09/23/20 1030 09/24/20 1800 09/25/20 0000   Weight: 66.1 kg (145 lb 11.6 oz) 66 kg (145 lb 6.4 oz) 66.6 kg (146 lb 12.8 oz)     Vital Signs with Ranges  Temp:  [97.4  F (36.3  C)] 97.4  F (36.3  C)  Pulse:  [80] 80  Resp:  [16] 16  BP: (132)/(72) 132/72  SpO2:  [95 %] 95 %  I/O last 3 completed shifts:  In: 250 [I.V.:250]  Out: 900 [Urine:900]  Constitutional: Pleasant, tired appearing male seen reclined in bed. Awake, alert, interactive but struggling with word finding. NAD.   Eyes: Sclerae clear, anicteric.   HENT: +glasses. Large left medial head wound is c/d/i without bleeding, drainage or significant erythema. He does have 2 nodularities on right forehead without overlying ecchymosis. Nontender. OP slightly dry.  Respiratory: No increased work of breathing, good air exchange, on RA. Lungs clear to auscultation bilaterally, no crackles or wheezing appreciated.   Cardiovascular: RRR, no murmur noted.  GI: Normal BS. Abd soft, non-distended, non-tender, no masses  or organomegaly appreciated.   Skin: No rashes on limited exam.  Musculoskeletal: Moves self in bed. There is no redness, warmth, or swelling of the joints. Tone is normal. No edema.   Neurologic: Alert and oriented. Word finding difficulty.   Neuropsychiatric: Calm, normal eye contact, affect congruent. Memory for recent events is intact.          Discharge Disposition   Discharged to home  Condition at discharge: Stable    Consultations This Hospital Stay   PHARMACY IP CONSULT  ONCOLOGY ADULT IP CONSULT  PHYSICAL THERAPY ADULT IP CONSULT  OCCUPATIONAL THERAPY ADULT IP CONSULT    Discharge Orders      Reason for your hospital stay    You were admitted for a biopsy of your brain mass. The preliminary result showed a lymphoma. You will be discharged to home and the Hematology Team at [facility] will be contacting your formerly [facility] team (on Monday, 9/28) about having you follow-up for further workup and treatment more locally.     Follow Up and recommended labs and tests    The Hematology Team at the [facility] will be in touch with your formerly [facility] doctor(s) to set up more local follow-up for you. You/your family should receive a call from the Hematology Team by early next week (Monday, 9/28 or Tuesday, 9/29) to let you know what appointments have been arranged.    Additionally:   - on 10/9 you have a video visit with Dr. Cummins as a post-procedure follow-up visit  - please have your local physician do an in-person wound check in approximately 2 weeks (around 10/8) per Neurosurgery recommendations     Activity    Your activity upon discharge: activity as tolerated     Wound care and dressings    Instructions to care for your wound at home:  - ok to leave open to air  - starting on Sunday, 9/27 - you can shower and let water run over your head incision but do not scrub the area  - on 10/9 you have a video visit with Dr. Cummins as a post-procedure follow-up visit  - please have your local  physician do a wound check in approximately 2 weeks (around 10/8)     When to contact your care team    Please call your [facility] physician(s) for any of the following:  temperature > or = 100.4, uncontrolled nausea/vomiting/diarrhea/constipation, unrelieved pain, bleeding not relieved with pressure, dizziness, chest pain, shortness of breath, loss of consciousness, and any new or concerning symptoms.     Diet    Follow this diet upon discharge: regular diet as tolerated     Discharge Medications   Current Discharge Medication List      START taking these medications    Details   acetaminophen (TYLENOL) 325 MG tablet Take 2 tablets (650 mg) by mouth every 4 hours as needed for other (multimodal surgical pain management along with NSAIDS and opioid medication as indicated based on pain control and physical function.)  Qty:        ondansetron (ZOFRAN-ODT) 4 MG ODT tab Take 1 tablet (4 mg) by mouth every 6 hours as needed for nausea or vomiting  Qty: 10 tablet, Refills: 0    Associated Diagnoses: Lymphoma of lymph nodes of head, unspecified lymphoma type (H)      oxyCODONE (ROXICODONE) 5 MG tablet Take 1-2 tablets (5-10 mg) by mouth every 4 hours as needed for moderate to severe pain  Qty: 5 tablet, Refills: 0    Associated Diagnoses: Brain mass      senna-docusate (SENOKOT-S/PERICOLACE) 8.6-50 MG tablet Take 1 tablet by mouth 2 times daily If no bowel movement in 24 hours, increase to 2 tablets PO.  Hold for loose stools. Once regular bowel movements have returned after surgery, you can use this medication only if needed.  Qty: 20 tablet, Refills: 0    Associated Diagnoses: Brain mass         CONTINUE these medications which have CHANGED    Details   dexamethasone (DECADRON) 4 MG tablet Take 1 tablet (4 mg) by mouth every 8 hours  Qty: 90 tablet, Refills: 0    Associated Diagnoses: Lymphoma of lymph nodes of head, unspecified lymphoma type (H); Brain mass      levETIRAcetam (KEPPRA) 750 MG tablet Take 1 tablet (750 mg) by  mouth 2 times daily  Qty: 60 tablet, Refills: 0    Associated Diagnoses: Brain mass      metFORMIN (GLUCOPHAGE) 500 MG tablet Take 1 tablet (500 mg) by mouth 2 times daily (with meals)  Qty: 60 tablet, Refills: 0    Associated Diagnoses: Hyperglycemia      omeprazole (PRILOSEC) 20 MG DR capsule Take 1 capsule (20 mg) by mouth At Bedtime  Qty: 30 capsule, Refills: 0    Associated Diagnoses: Status post craniotomy         CONTINUE these medications which have NOT CHANGED    Details   budesonide (PULMICORT FLEXHALER) 90 MCG/ACT inhaler Inhale 2 puffs into the lungs 2 times daily      metoprolol succinate ER (TOPROL-XL) 25 MG 24 hr tablet Take 25 mg by mouth daily      nicotine (COMMIT) 2 MG lozenge Place 2 mg inside cheek every hour as needed for smoking cessation      tiotropium (SPIRIVA) 18 MCG inhaled capsule Inhale 18 mcg into the lungs daily           Allergies   Allergies   Allergen Reactions     Wellbutrin [Bupropion]      Data   Results for orders placed or performed during the hospital encounter of 09/23/20   CT Head w/o Contrast    Narrative    CT HEAD W/O CONTRAST 9/24/2020 3:56 PM    History: s/p craniotomy   ICD-10:    [facility]    Technique: Using multidetector thin collimation helical acquisition  technique, axial, coronal and sagittal CT images from the skull base  to the vertex were obtained without intravenous contrast.    Findings: There are postoperative changes of left frontoparietal  craniotomy with underlying resection cavity in the left frontal lobe  and resultant pneumocephalus. There is minimal density associated with  the inferior and posterior portion of the resection cavity which  likely represents a small amount of postoperative hemorrhage. There is  mild pneumocephalus overlying the frontal lobes and beneath the  craniotomy.. . Ventricles are proportionate to the cerebral sulci. The  basal cisterns are clear.    The bony calvaria and the bones of the skull  base are normal. The  visualized portions of the paranasal sinuses and mastoid air cells are  clear.       Impression    Impression:  Postoperative changes of left frontal tumor resection with  pneumocephalus and a small amount of adjacent postoperative  hemorrhage. Recommend follow-up in 6 hours.    IVA ROWLAND MD   CT Head w/o Contrast    Narrative    CT HEAD W/O CONTRAST 9/24/2020 10:05 PM    History: Intracranial hemorrhage, known, follow up; S/p glioblastoma  resection. Small amount of hemorrhage in resection cavity postop  followup     Comparison: Earlier same day head CT obtained at 1545 hours, brain MRI  9/23/2020.    Technique: Using multidetector thin collimation helical acquisition  technique, axial, coronal and sagittal CT images from the skull base  to the vertex were obtained without intravenous contrast.    Findings: Postoperative changes of left frontoparietal craniotomy with  underlying mass resection in the left frontal lobe unchanged  pneumocephalus along the frontal lobe convexities, interhemispheric  fissure, and within the resection bed. There is minimal mass effect  upon bilateral frontal lobes. Unchanged hyperdense blood around the  resection cavity as well as small foci of intraparenchymal hemorrhage  posteriorly and inferiorly. No midline shift. Gray/white matter  differentiation in both cerebral hemispheres is preserved. Ventricles  are proportionate to the cerebral sulci. The basal cisterns are clear.  Atherosclerotic calcifications of the vertebral arteries and carotid  siphons.    Subcutaneous hyperdensity overlying the right frontal bone convexity  likely representing a benign sebaceous cyst. Small amount of  subcutaneous emphysema near the craniotomy site. The visualized  portions of the paranasal sinuses and mastoid air cells are clear.       Impression    Impression:  Postoperative changes of left frontoparietal craniotomy with  underlying mass resection with unchanged amount of  adjacent  postoperative hemorrhage. Continued attention on follow-up.    I have personally reviewed the examination and initial interpretation  and I agree with the findings.    TERRA BISHOP MD   CT Head w/o Contrast    Narrative    CT HEAD W/O CONTRAST 9/25/2020 6:19 AM    History: Follow up post op scan   ICD-10:    Comparison: 9/24/2020    Technique: Using multidetector thin collimation helical acquisition  technique, axial, coronal and sagittal CT images from the skull base  to the vertex were obtained without intravenous contrast.    Findings: Postsurgical changes of left frontoparietal craniotomy with  underlying resection cavity in the left frontal lobe. Unchanged  bifrontal pneumocephalus. Unchanged hyperdense foci along the inferior  aspect of the resection cavity likely representing small punctate  postoperative hemorrhage. No acute basilar matter differentiation. The  ventricles are proportionate to the cerebral sulci. No midline shift.  Basal cisterns are patent. The visualized portions of the paranasal  sinuses and mastoid air cells are clear.       Impression    Impression:  No significant change in the small amount of postoperative hemorrhage  in the left frontal lobe. Recommend continued follow-up. No new  intracranial hemorrhage.    I have personally reviewed the examination and initial interpretation  and I agree with the findings.    TERRA BISHOP MD

## 2020-09-28 ENCOUNTER — PATIENT OUTREACH (OUTPATIENT)
Dept: ONCOLOGY | Facility: CLINIC | Age: 70
End: 2020-09-28

## 2020-09-28 NOTE — PLAN OF CARE
Physical Therapy Discharge Summary    Reason for therapy discharge:    Discharged to home.    Progress towards therapy goal(s). See goals on Care Plan in [facility] electronic health record for goal details.  Goals partially met.  Barriers to achieving goals:   discharge from facility.    Therapy recommendation(s):    No further therapy is recommended.

## 2020-09-28 NOTE — PROGRESS NOTES
"Oncology RN Care Coordination Note:     This writer called Alin for his post hospital follow up discharge call.  He states he is doing well, but still feels a bit \"scrambled\" at times.  He has his follow up with [facility] on 10/15/2020, they have scheduled chemo and radiation.  We also received a message from Dr. Bernal's nurse Keesha requesting Dr. Cummins call Dr. Bernal to discuss Alin's case.  I sent a message to Dr. Cummins and Ranjana Joseph RNCC with this request earlier today.     Sadia Durán, RN BSN   [facility]  Nurse Coordinator        "

## 2020-09-29 LAB — COPATH REPORT: NORMAL

## 2020-10-08 LAB — COPATH REPORT: NORMAL

## 2020-10-09 ENCOUNTER — VIRTUAL VISIT (OUTPATIENT)
Dept: NEUROSURGERY | Facility: CLINIC | Age: 70
End: 2020-10-09
Attending: NEUROLOGICAL SURGERY
Payer: COMMERCIAL

## 2020-10-09 DIAGNOSIS — G93.89 BRAIN MASS: Primary | ICD-10-CM

## 2020-10-09 PROCEDURE — 99024 POSTOP FOLLOW-UP VISIT: CPT | Mod: TEL | Performed by: NEUROLOGICAL SURGERY

## 2020-10-09 PROCEDURE — 999N001193 HC VIDEO/TELEPHONE VISIT; NO CHARGE

## 2020-10-09 NOTE — LETTER
10/9/2020         RE: Alin Reed  [id number] [address] [address]        Dear Colleague,    Thank you for referring your patient, Alin Reed, to the [facility]. Please see a copy of my visit note below.    Converted to telephone visit    Patient is doing well after his 9/23/2020 surgery and has no new complaints.     I reviewed my conversation with the patient's oncologist, with plan for initiation chemotherapy 4 weeks after the surgery.     All questions answered.      Again, thank you for allowing me to participate in the care of your patient.        Sincerely,        Amadou Cummins MD

## 2020-10-09 NOTE — PROGRESS NOTES
Converted to telephone visit    Patient is doing well after his 9/23/2020 surgery and has no new complaints.     I reviewed my conversation with the patient's oncologist, with plan for initiation chemotherapy 4 weeks after the surgery.     All questions answered.

## 2020-10-09 NOTE — LETTER
Date:October 12, 2020      Patient was self referred, no letter generated. Do not send.        [facility] Health Information

## 2020-10-23 ENCOUNTER — TELEPHONE (OUTPATIENT)
Dept: ONCOLOGY | Facility: CLINIC | Age: 70
End: 2020-10-23

## 2020-10-23 NOTE — TELEPHONE ENCOUNTER
Tobacco Treatment Program at the [facility] attempted to reach Mr. Reed on 10/23/2020 regarding the tobacco cessation program to help Mr. Reed to quit smoking. We will attempt to reach Mr. Reed another time.     [facility] Patterson [facility]  Tobacco Treatment Specialist  PH: [phone]    Was told patient was currently admitted

## 2020-11-11 NOTE — TELEPHONE ENCOUNTER
Tobacco Treatment Program at the [facility] attempted to reach Mr. Reed on 11/11/2020 regarding the tobacco cessation program to help Mr. Reed to quit smoking. We will attempt to reach Mr. Reed another time.     [facility]  Tobacco Treatment Specialist  PH: [phone]    Patient's wife states he has been getting chemo and is really overwhelmed with everything at this time.

## (undated) DEVICE — SOL RINGERS LACTATED 1000ML BAG 07953-09

## (undated) DEVICE — ESU GROUND PAD ADULT W/CORD E7507

## (undated) DEVICE — MARKER SPHERES PASSIVE MEDT PACK 5 8801075

## (undated) DEVICE — SU ETHILON 3-0 PS-1 18" 1663H

## (undated) DEVICE — NDL BLUNT 17GA 1.5" 8881202330

## (undated) DEVICE — SPONGE COTTONOID 1/4X1/4" 20-01S

## (undated) DEVICE — WIPES FOLEY CARE SURESTEP PROVON DFC100

## (undated) DEVICE — SPONGE COTTONOID 1/2X1 1/2" 20-06S

## (undated) DEVICE — BUR ROUTER 1.4X12.8MM ANSPACH S-1R

## (undated) DEVICE — SPONGE COTTONOID 1X3" 20-10S

## (undated) DEVICE — DRAPE CRANIOTOMY W/POUCH 9450

## (undated) DEVICE — SURGICEL HEMOSTAT 4X8" 1952

## (undated) DEVICE — TUBING SUCTION 10'X3/16" N510

## (undated) DEVICE — DECANTER BAG 2002S

## (undated) DEVICE — DRSG GAUZE 4X4" TRAY 6939

## (undated) DEVICE — SYR 03ML LL W/O NDL 309657

## (undated) DEVICE — SYR 30ML LL W/O NDL 302832

## (undated) DEVICE — CRANIOTOME ADULT ANSPACH A-CRN

## (undated) DEVICE — DRAPE SHEET REV FOLD 3/4 9349

## (undated) DEVICE — SPONGE COTTONOID 1/2X1/2" 20-04S

## (undated) DEVICE — ESU CORD BIPOLAR GREEN 10-4000

## (undated) DEVICE — SPONGE SURGIFOAM 100 1974

## (undated) DEVICE — SU NUROLON 4-0 TF CR 8X18" C584D

## (undated) DEVICE — PREP SKIN SCRUB TRAY 4461A

## (undated) DEVICE — PREP POVIDONE IODINE SCRUB 7.5% 4OZ APL82212

## (undated) DEVICE — SOL NACL 0.9% 10ML VIAL 0409-4888-02

## (undated) DEVICE — LINEN TOWEL PACK X6 WHITE 5487

## (undated) DEVICE — DRAPE POUCH INSTRUMENT 1018

## (undated) DEVICE — PREP POVIDONE IODINE SOLUTION 10% 4OZ

## (undated) DEVICE — LINEN TOWEL PACK X5 5464

## (undated) DEVICE — DRAPE MICROSCOPE LEICA 54X150" AR8033650

## (undated) DEVICE — SU VICRYL 2-0 CT-2 CR 8X18" J726D

## (undated) DEVICE — PREP CHLORAPREP CLEAR 3ML 260400

## (undated) DEVICE — PAD CHUX UNDERPAD 23X24" 7136

## (undated) DEVICE — RX BACITRACIN OINTMENT 0.9G 1/32OZ CUR001109

## (undated) DEVICE — ESU CORD BIPOLAR AND IRR TUBING AESCULAP US355

## (undated) DEVICE — PERFORATOR 14MM CODMAN

## (undated) DEVICE — PACK CRANIOTOMY

## (undated) RX ORDER — ESMOLOL HYDROCHLORIDE 10 MG/ML
INJECTION INTRAVENOUS
Status: DISPENSED
Start: 2020-09-24

## (undated) RX ORDER — GLYCOPYRROLATE 0.2 MG/ML
INJECTION, SOLUTION INTRAMUSCULAR; INTRAVENOUS
Status: DISPENSED
Start: 2020-09-24

## (undated) RX ORDER — PROPOFOL 10 MG/ML
INJECTION, EMULSION INTRAVENOUS
Status: DISPENSED
Start: 2020-09-24

## (undated) RX ORDER — ONDANSETRON 2 MG/ML
INJECTION INTRAMUSCULAR; INTRAVENOUS
Status: DISPENSED
Start: 2020-09-24

## (undated) RX ORDER — MANNITOL 20 G/100ML
INJECTION, SOLUTION INTRAVENOUS
Status: DISPENSED
Start: 2020-09-24

## (undated) RX ORDER — LIDOCAINE HYDROCHLORIDE 20 MG/ML
INJECTION, SOLUTION EPIDURAL; INFILTRATION; INTRACAUDAL; PERINEURAL
Status: DISPENSED
Start: 2020-09-24

## (undated) RX ORDER — BUPIVACAINE HYDROCHLORIDE 2.5 MG/ML
INJECTION, SOLUTION EPIDURAL; INFILTRATION; INTRACAUDAL
Status: DISPENSED
Start: 2020-09-24

## (undated) RX ORDER — LIDOCAINE HYDROCHLORIDE 20 MG/ML
JELLY TOPICAL
Status: DISPENSED
Start: 2020-09-24

## (undated) RX ORDER — ACETAMINOPHEN 325 MG/1
TABLET ORAL
Status: DISPENSED
Start: 2020-09-23

## (undated) RX ORDER — SODIUM CHLORIDE 9 MG/ML
INJECTION, SOLUTION INTRAVENOUS
Status: DISPENSED
Start: 2020-09-24

## (undated) RX ORDER — FENTANYL CITRATE 50 UG/ML
INJECTION, SOLUTION INTRAMUSCULAR; INTRAVENOUS
Status: DISPENSED
Start: 2020-09-24

## (undated) RX ORDER — DEXAMETHASONE SODIUM PHOSPHATE 4 MG/ML
INJECTION, SOLUTION INTRA-ARTICULAR; INTRALESIONAL; INTRAMUSCULAR; INTRAVENOUS; SOFT TISSUE
Status: DISPENSED
Start: 2020-09-24